# Patient Record
Sex: FEMALE | Race: OTHER | HISPANIC OR LATINO
[De-identification: names, ages, dates, MRNs, and addresses within clinical notes are randomized per-mention and may not be internally consistent; named-entity substitution may affect disease eponyms.]

---

## 2019-06-28 ENCOUNTER — APPOINTMENT (OUTPATIENT)
Dept: FAMILY MEDICINE | Facility: CLINIC | Age: 54
End: 2019-06-28

## 2019-11-02 ENCOUNTER — APPOINTMENT (OUTPATIENT)
Dept: DERMATOLOGY | Facility: CLINIC | Age: 54
End: 2019-11-02
Payer: MEDICAID

## 2019-11-02 PROCEDURE — 99202 OFFICE O/P NEW SF 15 MIN: CPT

## 2019-11-30 ENCOUNTER — APPOINTMENT (OUTPATIENT)
Dept: DERMATOLOGY | Facility: CLINIC | Age: 54
End: 2019-11-30

## 2020-07-16 ENCOUNTER — APPOINTMENT (OUTPATIENT)
Dept: NEUROLOGY | Facility: CLINIC | Age: 55
End: 2020-07-16
Payer: MEDICAID

## 2020-07-16 VITALS — BODY MASS INDEX: 23.04 KG/M2 | TEMPERATURE: 97.7 F | HEIGHT: 63 IN | WEIGHT: 130 LBS

## 2020-07-16 DIAGNOSIS — Z86.39 PERSONAL HISTORY OF OTHER ENDOCRINE, NUTRITIONAL AND METABOLIC DISEASE: ICD-10-CM

## 2020-07-16 DIAGNOSIS — Z86.79 PERSONAL HISTORY OF OTHER DISEASES OF THE CIRCULATORY SYSTEM: ICD-10-CM

## 2020-07-16 PROCEDURE — 99204 OFFICE O/P NEW MOD 45 MIN: CPT

## 2020-07-16 NOTE — ASSESSMENT
[FreeTextEntry1] : Loss of taste and smell since March. Reportedly covid negative on multiple tests. ENT evaluation negative. We will check a brain MRI in the unlikely event that there is an olfactory groove meningioma.

## 2020-07-16 NOTE — HISTORY OF PRESENT ILLNESS
[FreeTextEntry1] : She reports a fairly acute onset of loss of taste and smell on 3/19/20. She has been tested for Covid both nasal swab and antibodies 3 times. She said it was negative. Taste and smell have not returned. She saw an ENT specialist who could find no problem. she offers no other complaints.\par \par Medical history significant for hypertension hyperlipidemia.\par \par She is a smoker. No significant alcohol use. She is a .

## 2020-07-16 NOTE — PHYSICAL EXAM
[General Appearance - Alert] : alert [General Appearance - Well-Appearing] : healthy appearing [General Appearance - In No Acute Distress] : in no acute distress [Affect] : the affect was normal [Oriented To Time, Place, And Person] : oriented to person, place, and time [Impaired Insight] : insight and judgment were intact [Memory Recent] : recent memory was not impaired [Person] : oriented to person [Place] : oriented to place [Time] : oriented to time [Concentration Intact] : normal concentrating ability [Fluency] : fluency intact [Comprehension] : comprehension intact [Cranial Nerves Optic (II)] : visual acuity intact bilaterally,  visual fields full to confrontation, pupils equal round and reactive to light [Cranial Nerves Facial (VII)] : face symmetrical [Cranial Nerves Oculomotor (III)] : extraocular motion intact [Cranial Nerves Trigeminal (V)] : facial sensation intact symmetrically [Cranial Nerves Vestibulocochlear (VIII)] : hearing was intact bilaterally [Cranial Nerves Glossopharyngeal (IX)] : tongue and palate midline [Cranial Nerves Accessory (XI - Cranial And Spinal)] : head turning and shoulder shrug symmetric [Cranial Nerves Hypoglossal (XII)] : there was no tongue deviation with protrusion [Motor Tone] : muscle tone was normal in all four extremities [No Muscle Atrophy] : normal bulk in all four extremities [Motor Strength] : muscle strength was normal in all four extremities [Paresis Pronator Drift Left-Sided] : no pronator drift on the left [Sensation Pain / Temperature Decrease] : pain and temperature was intact [Sensation Tactile Decrease] : light touch was intact [Paresis Pronator Drift Right-Sided] : no pronator drift on the right [Abnormal Walk] : normal gait [Romberg's Sign] : Romberg's sign was negtive [Balance] : balance was intact [Tremor] : no tremor present [Past-pointing] : there was no past-pointing [Coordination - Dysmetria Impaired Heel-to-Shin Bilateral] : not present [Coordination - Dysmetria Impaired Finger-to-Nose Bilateral] : not present [2+] : Ankle jerk left 2+ [Plantar Reflex Left Only] : normal on the left [Plantar Reflex Right Only] : normal on the right [PERRL With Normal Accommodation] : pupils were equal in size, round, reactive to light, with normal accommodation [Extraocular Movements] : extraocular movements were intact [Edema] : there was no peripheral edema

## 2020-07-30 ENCOUNTER — OUTPATIENT (OUTPATIENT)
Dept: OUTPATIENT SERVICES | Facility: HOSPITAL | Age: 55
LOS: 1 days | End: 2020-07-30
Payer: COMMERCIAL

## 2020-07-30 ENCOUNTER — APPOINTMENT (OUTPATIENT)
Dept: MRI IMAGING | Facility: CLINIC | Age: 55
End: 2020-07-30
Payer: MEDICAID

## 2020-07-30 DIAGNOSIS — R43.2 PARAGEUSIA: ICD-10-CM

## 2020-07-30 PROCEDURE — 70553 MRI BRAIN STEM W/O & W/DYE: CPT | Mod: 26

## 2020-07-30 PROCEDURE — A9585: CPT

## 2020-07-30 PROCEDURE — 70553 MRI BRAIN STEM W/O & W/DYE: CPT

## 2021-07-02 ENCOUNTER — NON-APPOINTMENT (OUTPATIENT)
Age: 56
End: 2021-07-02

## 2021-07-13 ENCOUNTER — APPOINTMENT (OUTPATIENT)
Dept: DERMATOLOGY | Facility: CLINIC | Age: 56
End: 2021-07-13
Payer: MEDICAID

## 2021-07-13 PROCEDURE — 99072 ADDL SUPL MATRL&STAF TM PHE: CPT

## 2021-07-13 PROCEDURE — 17110 DESTRUCTION B9 LES UP TO 14: CPT

## 2021-07-13 PROCEDURE — 99212 OFFICE O/P EST SF 10 MIN: CPT | Mod: 25

## 2021-07-27 ENCOUNTER — TRANSCRIPTION ENCOUNTER (OUTPATIENT)
Age: 56
End: 2021-07-27

## 2021-07-29 ENCOUNTER — APPOINTMENT (OUTPATIENT)
Dept: DERMATOLOGY | Facility: CLINIC | Age: 56
End: 2021-07-29
Payer: MEDICAID

## 2021-07-29 PROCEDURE — 10060 I&D ABSCESS SIMPLE/SINGLE: CPT | Mod: 59

## 2021-07-29 PROCEDURE — 99213 OFFICE O/P EST LOW 20 MIN: CPT | Mod: 25

## 2021-07-29 PROCEDURE — 99072 ADDL SUPL MATRL&STAF TM PHE: CPT

## 2021-07-29 PROCEDURE — 17110 DESTRUCTION B9 LES UP TO 14: CPT

## 2021-09-18 ENCOUNTER — OUTPATIENT (OUTPATIENT)
Dept: OUTPATIENT SERVICES | Facility: HOSPITAL | Age: 56
LOS: 1 days | End: 2021-09-18
Payer: COMMERCIAL

## 2021-09-18 ENCOUNTER — APPOINTMENT (OUTPATIENT)
Dept: MRI IMAGING | Facility: CLINIC | Age: 56
End: 2021-09-18
Payer: MEDICAID

## 2021-09-18 DIAGNOSIS — Z00.8 ENCOUNTER FOR OTHER GENERAL EXAMINATION: ICD-10-CM

## 2021-09-18 DIAGNOSIS — R43.0 ANOSMIA: ICD-10-CM

## 2021-09-18 PROCEDURE — A9585: CPT

## 2021-09-18 PROCEDURE — 70553 MRI BRAIN STEM W/O & W/DYE: CPT

## 2021-09-18 PROCEDURE — 70553 MRI BRAIN STEM W/O & W/DYE: CPT | Mod: 26

## 2021-10-17 ENCOUNTER — TRANSCRIPTION ENCOUNTER (OUTPATIENT)
Age: 56
End: 2021-10-17

## 2021-12-16 ENCOUNTER — APPOINTMENT (OUTPATIENT)
Dept: NEUROLOGY | Facility: CLINIC | Age: 56
End: 2021-12-16

## 2022-03-11 ENCOUNTER — APPOINTMENT (OUTPATIENT)
Dept: NEUROLOGY | Facility: CLINIC | Age: 57
End: 2022-03-11
Payer: MEDICAID

## 2022-03-11 VITALS
HEIGHT: 63 IN | WEIGHT: 130 LBS | SYSTOLIC BLOOD PRESSURE: 120 MMHG | DIASTOLIC BLOOD PRESSURE: 70 MMHG | BODY MASS INDEX: 23.04 KG/M2

## 2022-03-11 DIAGNOSIS — R43.0 ANOSMIA: ICD-10-CM

## 2022-03-11 DIAGNOSIS — R43.2 PARAGEUSIA: ICD-10-CM

## 2022-03-11 DIAGNOSIS — I67.81 ACUTE CEREBROVASCULAR INSUFFICIENCY: ICD-10-CM

## 2022-03-11 PROCEDURE — 99214 OFFICE O/P EST MOD 30 MIN: CPT

## 2022-03-11 PROCEDURE — 99072 ADDL SUPL MATRL&STAF TM PHE: CPT

## 2022-03-11 NOTE — ASSESSMENT
[FreeTextEntry1] : Neurologically stable\par Brain MRI shows small vessel ischemic changes\par Discussed with patient.  Encouraged smoking cessation\par Follow-up as needed

## 2022-03-11 NOTE — HISTORY OF PRESENT ILLNESS
[FreeTextEntry1] : I saw her initially 7/16/2020 with the following history.  She reports a fairly acute onset of loss of taste and smell on 3/19/20. She has been tested for Covid both nasal swab and antibodies 3 times. She said it was negative. Taste and smell have not returned. She saw an ENT specialist who could find no problem. she offers no other complaints.\par \par Medical history significant for hypertension hyperlipidemia.\par \par She is a smoker. No significant alcohol use. She is a .\par \par I did a brain MRI on her which showed some small vessel ischemic changes along with a lesion in the right nasopharynx.  She was rechecked by ENT and had another brain MRI on 9/18/2021 which was unchanged\par \par She returns today, 3/11/2022 for follow-up.  She says her taste and smell have come back partially.  She says it turned out she did have Covid back in 2020.  Offers no new complaints.

## 2022-03-11 NOTE — PHYSICAL EXAM
[General Appearance - Alert] : alert [General Appearance - In No Acute Distress] : in no acute distress [General Appearance - Well-Appearing] : healthy appearing [Oriented To Time, Place, And Person] : oriented to person, place, and time [Impaired Insight] : insight and judgment were intact [Affect] : the affect was normal [Memory Recent] : recent memory was not impaired [Person] : oriented to person [Place] : oriented to place [Time] : oriented to time [Concentration Intact] : normal concentrating ability [Fluency] : fluency intact [Comprehension] : comprehension intact [Cranial Nerves Optic (II)] : visual acuity intact bilaterally,  visual fields full to confrontation, pupils equal round and reactive to light [Cranial Nerves Oculomotor (III)] : extraocular motion intact [Cranial Nerves Trigeminal (V)] : facial sensation intact symmetrically [Cranial Nerves Facial (VII)] : face symmetrical [Cranial Nerves Vestibulocochlear (VIII)] : hearing was intact bilaterally [Cranial Nerves Glossopharyngeal (IX)] : tongue and palate midline [Cranial Nerves Accessory (XI - Cranial And Spinal)] : head turning and shoulder shrug symmetric [Cranial Nerves Hypoglossal (XII)] : there was no tongue deviation with protrusion [Motor Tone] : muscle tone was normal in all four extremities [Motor Strength] : muscle strength was normal in all four extremities [No Muscle Atrophy] : normal bulk in all four extremities [Paresis Pronator Drift Right-Sided] : no pronator drift on the right [Paresis Pronator Drift Left-Sided] : no pronator drift on the left [Sensation Tactile Decrease] : light touch was intact [Sensation Pain / Temperature Decrease] : pain and temperature was intact [Romberg's Sign] : Romberg's sign was negtive [Abnormal Walk] : normal gait [Balance] : balance was intact [Past-pointing] : there was no past-pointing [Tremor] : no tremor present [Coordination - Dysmetria Impaired Finger-to-Nose Bilateral] : not present [Coordination - Dysmetria Impaired Heel-to-Shin Bilateral] : not present [2+] : Ankle jerk left 2+ [Plantar Reflex Right Only] : normal on the right [Plantar Reflex Left Only] : normal on the left [PERRL With Normal Accommodation] : pupils were equal in size, round, reactive to light, with normal accommodation [Extraocular Movements] : extraocular movements were intact

## 2022-04-15 ENCOUNTER — APPOINTMENT (OUTPATIENT)
Dept: OTOLARYNGOLOGY | Facility: CLINIC | Age: 57
End: 2022-04-15
Payer: MEDICAID

## 2022-04-15 VITALS
OXYGEN SATURATION: 98 % | TEMPERATURE: 98.1 F | DIASTOLIC BLOOD PRESSURE: 70 MMHG | SYSTOLIC BLOOD PRESSURE: 121 MMHG | WEIGHT: 130.5 LBS | HEIGHT: 63 IN | HEART RATE: 107 BPM | BODY MASS INDEX: 23.12 KG/M2

## 2022-04-15 DIAGNOSIS — J35.01 CHRONIC TONSILLITIS: ICD-10-CM

## 2022-04-15 DIAGNOSIS — R07.0 PAIN IN THROAT: ICD-10-CM

## 2022-04-15 DIAGNOSIS — K13.21 LEUKOPLAKIA OF ORAL MUCOSA, INCLUDING TONGUE: ICD-10-CM

## 2022-04-15 PROCEDURE — 99244 OFF/OP CNSLTJ NEW/EST MOD 40: CPT

## 2022-04-15 NOTE — PHYSICAL EXAM
[Midline] : trachea located in midline position [de-identified] : L ventral leukoplakia. [de-identified] : Tonsil remnants seen in tonsillar fossa bilaterally.  Pt has a large superior L tonsillar fossa crypt [Normal] : no rashes

## 2022-04-15 NOTE — DATA REVIEWED
[de-identified] : IMAGES REVIEWED:\par CT neck soft tissue ([facility] 3/11/22) - Stable R NP cyst present.   No other lesions seen.

## 2022-04-15 NOTE — CONSULT LETTER
[Dear  ___] : Dear  [unfilled], [Consult Letter:] : I had the pleasure of evaluating your patient, [unfilled]. [Please see my note below.] : Please see my note below. [Consult Closing:] : Thank you very much for allowing me to participate in the care of this patient.  If you have any questions, please do not hesitate to contact me. [Sincerely,] : Sincerely, [FreeTextEntry2] : Ronnie Tineo MD [FreeTextEntry3] : Farhat Butcher MD, FACS\par Chief of Otolaryngology [facility]\par  - Dept. of Otolaryngology\par Formerly [facility] [employer]\par \par  [DrMarissa  ___] : Dr. LITTLE

## 2022-04-15 NOTE — ASSESSMENT
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  We will observe x 2 weeks.  If the leukoplakia persists we will consider biopsy and CO2 LASER ablation.  \par \par If tonsil region discomfort persists we may need to consider removal of the tonsil remnant that is present.

## 2022-04-15 NOTE — HISTORY OF PRESENT ILLNESS
[de-identified] : 55 y/o F with a h/o a possible lesion in her throat.  She points to the region of her L tonsillar fossa.  She feels like food gets caught in the area and she has pain when lying on her L side.   She was seen by Dr. Tineo who ordered an MRI.

## 2022-04-15 NOTE — REVIEW OF SYSTEMS
[Nasal Congestion] : nasal congestion [Swelling Neck] : swelling neck [Negative] : Heme/Lymph normal...

## 2022-06-06 ENCOUNTER — APPOINTMENT (OUTPATIENT)
Dept: OTOLARYNGOLOGY | Facility: CLINIC | Age: 57
End: 2022-06-06

## 2022-12-03 ENCOUNTER — OFFICE (OUTPATIENT)
Dept: URBAN - METROPOLITAN AREA CLINIC 112 | Facility: CLINIC | Age: 57
Setting detail: OPHTHALMOLOGY
End: 2022-12-03
Payer: MEDICAID

## 2022-12-03 DIAGNOSIS — H40.053: ICD-10-CM

## 2022-12-03 DIAGNOSIS — Z96.1: ICD-10-CM

## 2022-12-03 DIAGNOSIS — H16.223: ICD-10-CM

## 2022-12-03 PROCEDURE — 92012 INTRM OPH EXAM EST PATIENT: CPT | Performed by: OPHTHALMOLOGY

## 2022-12-03 PROCEDURE — 92250 FUNDUS PHOTOGRAPHY W/I&R: CPT | Performed by: OPHTHALMOLOGY

## 2022-12-03 PROCEDURE — 92083 EXTENDED VISUAL FIELD XM: CPT | Performed by: OPHTHALMOLOGY

## 2022-12-03 ASSESSMENT — CONFRONTATIONAL VISUAL FIELD TEST (CVF)
OS_FINDINGS: FULL
OD_FINDINGS: FULL

## 2022-12-03 ASSESSMENT — REFRACTION_MANIFEST
OS_SPHERE: PLANO
OS_VA1: 20/20
OS_SPHERE: +1.25
OS_CYLINDER: SPH
OD_VA1: 20/40
OD_CYLINDER: SPH
OD_SPHERE: +1.25
OD_ADD: +2.75
OS_CYLINDER: SPHERE
OS_ADD: +2.75
OS_VA1: 20/40
OS_ADD: +2.50
OD_ADD: +2.50
OD_SPHERE: PLANO
OS_CYLINDER: SPH
OS_VA2: 20/20
OD_CYLINDER: SPHERE
OD_SPHERE: PLANO
OD_VA1: 20/20
OD_CYLINDER: SPH
OS_SPHERE: +0.75
OD_VA2: 20/20

## 2022-12-03 ASSESSMENT — REFRACTION_CURRENTRX
OD_CYLINDER: -0.50
OS_CYLINDER: 0.00
OD_VPRISM_DIRECTION: BF
OD_AXIS: 042
OS_OVR_VA: 20/
OD_SPHERE: PLANO
OD_OVR_VA: 20/
OS_SPHERE: +0.50
OS_ADD: +2.50
OD_ADD: +2.25
OS_VPRISM_DIRECTION: BF
OS_AXIS: 000

## 2022-12-03 ASSESSMENT — REFRACTION_AUTOREFRACTION
OD_CYLINDER: -0.75
OS_AXIS: 092
OD_SPHERE: +0.50
OS_CYLINDER: -0.75
OS_SPHERE: +0.75
OD_AXIS: 069

## 2022-12-03 ASSESSMENT — LID POSITION - PTOSIS
OD_PTOSIS: ABSENT
OS_PTOSIS: ABSENT

## 2022-12-03 ASSESSMENT — KERATOMETRY
OD_AXISANGLE_DEGREES: 129
OS_K2POWER_DIOPTERS: 45.25
OS_K1POWER_DIOPTERS: 45.25
METHOD_AUTO_MANUAL: AUTO
OD_K2POWER_DIOPTERS: 45.25
OD_K1POWER_DIOPTERS: 45.00
OS_AXISANGLE_DEGREES: 090

## 2022-12-03 ASSESSMENT — VISUAL ACUITY
OD_BCVA: 20/25
OS_BCVA: 20/25+1

## 2022-12-03 ASSESSMENT — PACHYMETRY
OS_CT_CORRECTION: -6
OD_CT_UM: 614
OS_CT_UM: 626
OD_CT_CORRECTION: -5

## 2022-12-03 ASSESSMENT — AXIALLENGTH_DERIVED
OS_AL: 22.8285
OD_AL: 22.9635

## 2022-12-03 ASSESSMENT — SUPERFICIAL PUNCTATE KERATITIS (SPK)
OS_SPK: 1+
OD_SPK: 1+

## 2022-12-03 ASSESSMENT — SPHEQUIV_DERIVED
OS_SPHEQUIV: 0.375
OD_SPHEQUIV: 0.125

## 2022-12-03 ASSESSMENT — TONOMETRY
OD_IOP_MMHG: 20
OS_IOP_MMHG: 20

## 2022-12-03 ASSESSMENT — CORNEAL PTERYGIUM: OD_PTERYGIUM: TEMPORAL

## 2022-12-28 ENCOUNTER — APPOINTMENT (OUTPATIENT)
Dept: FAMILY MEDICINE | Facility: CLINIC | Age: 57
End: 2022-12-28

## 2023-05-19 ENCOUNTER — NON-APPOINTMENT (OUTPATIENT)
Age: 58
End: 2023-05-19

## 2023-05-19 ENCOUNTER — APPOINTMENT (OUTPATIENT)
Dept: INTERNAL MEDICINE | Facility: CLINIC | Age: 58
End: 2023-05-19
Payer: MEDICAID

## 2023-05-19 ENCOUNTER — LABORATORY RESULT (OUTPATIENT)
Age: 58
End: 2023-05-19

## 2023-05-19 VITALS
WEIGHT: 120 LBS | HEIGHT: 63 IN | DIASTOLIC BLOOD PRESSURE: 82 MMHG | SYSTOLIC BLOOD PRESSURE: 137 MMHG | BODY MASS INDEX: 21.26 KG/M2 | HEART RATE: 72 BPM

## 2023-05-19 DIAGNOSIS — Z82.3 FAMILY HISTORY OF STROKE: ICD-10-CM

## 2023-05-19 DIAGNOSIS — Z00.00 ENCOUNTER FOR GENERAL ADULT MEDICAL EXAMINATION W/OUT ABNORMAL FINDINGS: ICD-10-CM

## 2023-05-19 DIAGNOSIS — Z63.5 DISRUPTION OF FAMILY BY SEPARATION AND DIVORCE: ICD-10-CM

## 2023-05-19 DIAGNOSIS — R11.0 NAUSEA: ICD-10-CM

## 2023-05-19 DIAGNOSIS — F17.200 NICOTINE DEPENDENCE, UNSPECIFIED, UNCOMPLICATED: ICD-10-CM

## 2023-05-19 DIAGNOSIS — G44.209 TENSION-TYPE HEADACHE, UNSPECIFIED, NOT INTRACTABLE: ICD-10-CM

## 2023-05-19 DIAGNOSIS — J30.2 OTHER SEASONAL ALLERGIC RHINITIS: ICD-10-CM

## 2023-05-19 DIAGNOSIS — Z80.41 FAMILY HISTORY OF MALIGNANT NEOPLASM OF OVARY: ICD-10-CM

## 2023-05-19 DIAGNOSIS — F17.210 NICOTINE DEPENDENCE, CIGARETTES, UNCOMPLICATED: ICD-10-CM

## 2023-05-19 DIAGNOSIS — Z83.3 FAMILY HISTORY OF DIABETES MELLITUS: ICD-10-CM

## 2023-05-19 PROCEDURE — 99203 OFFICE O/P NEW LOW 30 MIN: CPT | Mod: 25

## 2023-05-19 PROCEDURE — 99386 PREV VISIT NEW AGE 40-64: CPT | Mod: 25

## 2023-05-19 PROCEDURE — 93000 ELECTROCARDIOGRAM COMPLETE: CPT

## 2023-05-19 PROCEDURE — 36415 COLL VENOUS BLD VENIPUNCTURE: CPT

## 2023-05-19 SDOH — SOCIAL STABILITY - SOCIAL INSECURITY: DISRUPTION OF FAMILY BY SEPARATION AND DIVORCE: Z63.5

## 2023-05-19 NOTE — ASSESSMENT
[FreeTextEntry1] : seasonal allergies\par cough\par sent meds\par bp stable\par hld to be checked\par get mammogram\par followup 6months\par gave new gyn referral

## 2023-05-19 NOTE — HISTORY OF PRESENT ILLNESS
[FreeTextEntry1] : For CPE [de-identified] : For CPE\par patient has history of htn, hld, had covid lost olfactory now better\par she has been coughing due to allergies and gets nauseated only new issues, allergies as well\par had egd and colonscopy age 51\par no chest pain sob or palpitations, drinks\par 8 cups of coffee dialy\par smokes  a pack last 3 weeks

## 2023-05-19 NOTE — HEALTH RISK ASSESSMENT
[Excellent] : ~his/her~  mood as  excellent [Yes] : Yes [Monthly or less (1 pt)] : Monthly or less (1 point) [0] : 2) Feeling down, depressed, or hopeless: Not at all (0) [PHQ-2 Negative - No further assessment needed] : PHQ-2 Negative - No further assessment needed [GLG1Omang] : 0 [HIV test declined] : HIV test declined [Hepatitis C test declined] : Hepatitis C test declined [Change in mental status noted] : No change in mental status noted [Language] : denies difficulty with language [Behavior] : denies difficulty with behavior [Learning/Retaining New Information] : denies difficulty learning/retaining new information [Handling Complex Tasks] : denies difficulty handling complex tasks [Reasoning] : denies difficulty with reasoning [Spatial Ability and Orientation] : denies difficulty with spatial ability and orientation [None] : None [Employed] : employed [Single] : single [] :  [Sexually Active] : not sexually active [High Risk Behavior] : no high risk behavior [Fully functional (bathing, dressing, toileting, transferring, walking, feeding)] : Fully functional (bathing, dressing, toileting, transferring, walking, feeding) [Fully functional (using the telephone, shopping, preparing meals, housekeeping, doing laundry, using] : Fully functional and needs no help or supervision to perform IADLs (using the telephone, shopping, preparing meals, housekeeping, doing laundry, using transportation, managing medications and managing finances) [Reports changes in hearing] : Reports no changes in hearing [Reports changes in vision] : Reports no changes in vision [Reports normal functional visual acuity (ie: able to read med bottle)] : Reports poor functional visual acuity.  [Reports changes in dental health] : Reports no changes in dental health [Smoke Detector] : smoke detector [Carbon Monoxide Detector] : carbon monoxide detector [Guns at Home] : no guns at home [Safety elements used in home] : safety elements used in home [Seat Belt] :  uses seat belt [Sunscreen] : does not use sunscreen [Travel to Developing Areas] : does not  travel to developing areas [TB Exposure] : is not being exposed to tuberculosis [Caregiver Concerns] : does not have caregiver concerns [Current] : Current [5-9] : 5-9

## 2023-05-19 NOTE — PHYSICAL EXAM
[No Acute Distress] : no acute distress [Well Nourished] : well nourished [Well Developed] : well developed [Well-Appearing] : well-appearing [Normal Sclera/Conjunctiva] : normal sclera/conjunctiva [PERRL] : pupils equal round and reactive to light [EOMI] : extraocular movements intact [Normal Outer Ear/Nose] : the outer ears and nose were normal in appearance [Normal Oropharynx] : the oropharynx was normal [No JVD] : no jugular venous distention [No Lymphadenopathy] : no lymphadenopathy [Supple] : supple [Thyroid Normal, No Nodules] : the thyroid was normal and there were no nodules present [No Respiratory Distress] : no respiratory distress  [No Accessory Muscle Use] : no accessory muscle use Hydroquinone Counseling:  Patient advised that medication may result in skin irritation, lightening (hypopigmentation), dryness, and burning.  In the event of skin irritation, the patient was advised to reduce the amount of the drug applied or use it less frequently.  Rarely, spots that are treated with hydroquinone can become darker (pseudoochronosis).  Should this occur, patient instructed to stop medication and call the office. The patient verbalized understanding of the proper use and possible adverse effects of hydroquinone.  All of the patient's questions and concerns were addressed. [Clear to Auscultation] : lungs were clear to auscultation bilaterally [Normal Rate] : normal rate  [Regular Rhythm] : with a regular rhythm [Normal S1, S2] : normal S1 and S2 [No Murmur] : no murmur heard [No Carotid Bruits] : no carotid bruits [No Abdominal Bruit] : a ~M bruit was not heard ~T in the abdomen [No Varicosities] : no varicosities [Pedal Pulses Present] : the pedal pulses are present [No Edema] : there was no peripheral edema [No Palpable Aorta] : no palpable aorta [No Extremity Clubbing/Cyanosis] : no extremity clubbing/cyanosis [Soft] : abdomen soft [Non Tender] : non-tender [Non-distended] : non-distended [No Masses] : no abdominal mass palpated [No HSM] : no HSM [Normal Bowel Sounds] : normal bowel sounds [Normal Posterior Cervical Nodes] : no posterior cervical lymphadenopathy [Normal Anterior Cervical Nodes] : no anterior cervical lymphadenopathy [No CVA Tenderness] : no CVA  tenderness [No Spinal Tenderness] : no spinal tenderness [No Joint Swelling] : no joint swelling [Grossly Normal Strength/Tone] : grossly normal strength/tone [No Rash] : no rash [Coordination Grossly Intact] : coordination grossly intact [No Focal Deficits] : no focal deficits [Normal Gait] : normal gait [Deep Tendon Reflexes (DTR)] : deep tendon reflexes were 2+ and symmetric [Normal Affect] : the affect was normal [Normal Insight/Judgement] : insight and judgment were intact

## 2023-05-20 LAB
ALBUMIN SERPL ELPH-MCNC: 4.7 G/DL
ALP BLD-CCNC: 96 U/L
ALT SERPL-CCNC: 15 U/L
ANION GAP SERPL CALC-SCNC: 14 MMOL/L
APPEARANCE: CLEAR
AST SERPL-CCNC: 22 U/L
BILIRUB SERPL-MCNC: 0.2 MG/DL
BILIRUBIN URINE: NEGATIVE
BLOOD URINE: NEGATIVE
BUN SERPL-MCNC: 6 MG/DL
CALCIUM SERPL-MCNC: 10.5 MG/DL
CHLORIDE SERPL-SCNC: 97 MMOL/L
CHOLEST SERPL-MCNC: 181 MG/DL
CO2 SERPL-SCNC: 23 MMOL/L
COLOR: YELLOW
CREAT SERPL-MCNC: 0.69 MG/DL
CREAT SPEC-SCNC: 71 MG/DL
EGFR: 102 ML/MIN/1.73M2
ESTIMATED AVERAGE GLUCOSE: 120 MG/DL
GLUCOSE QUALITATIVE U: NEGATIVE MG/DL
GLUCOSE SERPL-MCNC: 102 MG/DL
HBA1C MFR BLD HPLC: 5.8 %
HDLC SERPL-MCNC: 37 MG/DL
KETONES URINE: NEGATIVE MG/DL
LDLC SERPL CALC-MCNC: 101 MG/DL
LEUKOCYTE ESTERASE URINE: ABNORMAL
MICROALBUMIN 24H UR DL<=1MG/L-MCNC: 1.2 MG/DL
MICROALBUMIN/CREAT 24H UR-RTO: 17 MG/G
NITRITE URINE: NEGATIVE
NONHDLC SERPL-MCNC: 144 MG/DL
PH URINE: 6.5
POTASSIUM SERPL-SCNC: 5.2 MMOL/L
PROT SERPL-MCNC: 7.4 G/DL
PROTEIN URINE: NEGATIVE MG/DL
SODIUM SERPL-SCNC: 134 MMOL/L
SPECIFIC GRAVITY URINE: 1.01
T4 FREE SERPL-MCNC: 1 NG/DL
TRIGL SERPL-MCNC: 213 MG/DL
TSH SERPL-ACNC: 0.79 UIU/ML
UROBILINOGEN URINE: 0.2 MG/DL

## 2023-06-03 ENCOUNTER — OFFICE (OUTPATIENT)
Dept: URBAN - METROPOLITAN AREA CLINIC 112 | Facility: CLINIC | Age: 58
Setting detail: OPHTHALMOLOGY
End: 2023-06-03
Payer: MEDICAID

## 2023-06-03 DIAGNOSIS — H16.223: ICD-10-CM

## 2023-06-03 DIAGNOSIS — H40.053: ICD-10-CM

## 2023-06-03 DIAGNOSIS — Z96.1: ICD-10-CM

## 2023-06-03 PROCEDURE — 92014 COMPRE OPH EXAM EST PT 1/>: CPT | Performed by: OPHTHALMOLOGY

## 2023-06-03 PROCEDURE — 92133 CPTRZD OPH DX IMG PST SGM ON: CPT | Performed by: OPHTHALMOLOGY

## 2023-06-03 PROCEDURE — 83861 MICROFLUID ANALY TEARS: CPT | Performed by: OPHTHALMOLOGY

## 2023-06-03 PROCEDURE — 92020 GONIOSCOPY: CPT | Performed by: OPHTHALMOLOGY

## 2023-06-03 ASSESSMENT — REFRACTION_MANIFEST
OS_ADD: +2.75
OS_CYLINDER: SPH
OD_ADD: +2.75
OS_SPHERE: +1.25
OS_SPHERE: PLANO
OD_SPHERE: PLANO
OD_CYLINDER: SPH
OS_ADD: +2.50
OS_CYLINDER: SPHERE
OS_CYLINDER: SPH
OD_VA1: 20/40
OS_VA1: 20/20
OS_SPHERE: +0.75
OD_VA1: 20/20
OD_SPHERE: PLANO
OS_VA1: 20/40
OD_CYLINDER: SPH
OD_ADD: +2.50
OD_SPHERE: +1.25
OS_VA2: 20/20
OD_VA2: 20/20
OD_CYLINDER: SPHERE

## 2023-06-03 ASSESSMENT — KERATOMETRY
OS_K1POWER_DIOPTERS: 45.25
OS_K2POWER_DIOPTERS: 45.25
OD_K2POWER_DIOPTERS: 45.25
OD_K1POWER_DIOPTERS: 45.00
METHOD_AUTO_MANUAL: AUTO
OD_AXISANGLE_DEGREES: 122
OS_AXISANGLE_DEGREES: 096

## 2023-06-03 ASSESSMENT — AXIALLENGTH_DERIVED
OS_AL: 22.8742
OD_AL: 23.0098

## 2023-06-03 ASSESSMENT — REFRACTION_CURRENTRX
OS_CYLINDER: 0.00
OD_ADD: +2.25
OS_VPRISM_DIRECTION: BF
OS_AXIS: 000
OD_VPRISM_DIRECTION: BF
OS_SPHERE: +0.50
OS_OVR_VA: 20/
OS_ADD: +2.50
OD_AXIS: 042
OD_OVR_VA: 20/
OD_CYLINDER: -0.50
OD_SPHERE: PLANO

## 2023-06-03 ASSESSMENT — LID POSITION - PTOSIS
OS_PTOSIS: ABSENT
OD_PTOSIS: ABSENT

## 2023-06-03 ASSESSMENT — REFRACTION_AUTOREFRACTION
OS_CYLINDER: -0.50
OD_CYLINDER: -0.50
OS_AXIS: 096
OD_SPHERE: +0.25
OS_SPHERE: +0.50
OD_AXIS: 054

## 2023-06-03 ASSESSMENT — SPHEQUIV_DERIVED
OS_SPHEQUIV: 0.25
OD_SPHEQUIV: 0

## 2023-06-03 ASSESSMENT — VISUAL ACUITY
OD_BCVA: 20/25+1
OS_BCVA: 20/25

## 2023-06-03 ASSESSMENT — SUPERFICIAL PUNCTATE KERATITIS (SPK)
OD_SPK: 1+
OS_SPK: 1+

## 2023-06-03 ASSESSMENT — PACHYMETRY
OS_CT_CORRECTION: -6
OD_CT_UM: 614
OD_CT_CORRECTION: -5
OS_CT_UM: 626

## 2023-06-03 ASSESSMENT — CONFRONTATIONAL VISUAL FIELD TEST (CVF)
OD_FINDINGS: FULL
OS_FINDINGS: FULL

## 2023-06-03 ASSESSMENT — TONOMETRY
OD_IOP_MMHG: 18
OS_IOP_MMHG: 18

## 2023-06-03 ASSESSMENT — CORNEAL PTERYGIUM: OD_PTERYGIUM: TEMPORAL

## 2023-06-10 ENCOUNTER — OFFICE (OUTPATIENT)
Dept: URBAN - METROPOLITAN AREA CLINIC 112 | Facility: CLINIC | Age: 58
Setting detail: OPHTHALMOLOGY
End: 2023-06-10
Payer: MEDICAID

## 2023-06-10 DIAGNOSIS — H02.423: ICD-10-CM

## 2023-06-10 DIAGNOSIS — H02.524: ICD-10-CM

## 2023-06-10 DIAGNOSIS — H02.521: ICD-10-CM

## 2023-06-10 PROBLEM — H02.422 PTOSIS MYOGENIC; RIGHT EYE, LEFT EYE, BOTH EYES: Status: ACTIVE | Noted: 2023-06-10

## 2023-06-10 PROBLEM — H02.421 PTOSIS MYOGENIC; RIGHT EYE, LEFT EYE, BOTH EYES: Status: ACTIVE | Noted: 2023-06-10

## 2023-06-10 PROCEDURE — 92082 INTERMEDIATE VISUAL FIELD XM: CPT | Performed by: OPHTHALMOLOGY

## 2023-06-10 PROCEDURE — 92012 INTRM OPH EXAM EST PATIENT: CPT | Performed by: OPHTHALMOLOGY

## 2023-06-10 PROCEDURE — 92285 EXTERNAL OCULAR PHOTOGRAPHY: CPT | Performed by: OPHTHALMOLOGY

## 2023-06-10 ASSESSMENT — REFRACTION_CURRENTRX
OD_AXIS: 042
OD_ADD: +2.25
OS_OVR_VA: 20/
OS_AXIS: 000
OD_CYLINDER: -0.50
OD_VPRISM_DIRECTION: BF
OS_SPHERE: +0.50
OS_VPRISM_DIRECTION: BF
OS_ADD: +2.50
OS_CYLINDER: 0.00
OD_OVR_VA: 20/
OD_SPHERE: PLANO

## 2023-06-10 ASSESSMENT — REFRACTION_MANIFEST
OS_ADD: +2.75
OD_CYLINDER: SPHERE
OD_VA1: 20/20
OS_VA1: 20/40
OD_VA1: 20/40
OS_SPHERE: PLANO
OS_ADD: +2.50
OS_CYLINDER: SPHERE
OD_SPHERE: PLANO
OD_CYLINDER: SPH
OD_ADD: +2.75
OS_SPHERE: +0.75
OS_CYLINDER: SPH
OS_SPHERE: +1.25
OS_CYLINDER: SPH
OD_SPHERE: PLANO
OD_ADD: +2.50
OD_CYLINDER: SPH
OS_VA1: 20/20
OD_VA2: 20/20
OS_VA2: 20/20
OD_SPHERE: +1.25

## 2023-06-10 ASSESSMENT — PACHYMETRY
OS_CT_UM: 626
OD_CT_UM: 614
OS_CT_CORRECTION: -6
OD_CT_CORRECTION: -5

## 2023-06-10 ASSESSMENT — REFRACTION_AUTOREFRACTION
OS_CYLINDER: -0.25
OS_AXIS: 097
OS_SPHERE: +0.50
OD_SPHERE: +0.25
OD_AXIS: 057
OD_CYLINDER: -0.50

## 2023-06-10 ASSESSMENT — CORNEAL PTERYGIUM: OD_PTERYGIUM: TEMPORAL

## 2023-06-10 ASSESSMENT — KERATOMETRY
OS_AXISANGLE_DEGREES: 090
OD_K1POWER_DIOPTERS: 44.75
OD_AXISANGLE_DEGREES: 111
METHOD_AUTO_MANUAL: AUTO
OS_K1POWER_DIOPTERS: 45.25
OD_K2POWER_DIOPTERS: 45.25
OS_K2POWER_DIOPTERS: 45.25

## 2023-06-10 ASSESSMENT — VISUAL ACUITY
OD_BCVA: 20/25+
OS_BCVA: 20/25

## 2023-06-10 ASSESSMENT — TONOMETRY
OS_IOP_MMHG: 21
OD_IOP_MMHG: 20

## 2023-06-10 ASSESSMENT — AXIALLENGTH_DERIVED
OD_AL: 23.0535
OS_AL: 22.8285

## 2023-06-10 ASSESSMENT — SPHEQUIV_DERIVED
OD_SPHEQUIV: 0
OS_SPHEQUIV: 0.375

## 2023-06-10 ASSESSMENT — SUPERFICIAL PUNCTATE KERATITIS (SPK)
OS_SPK: 1+
OD_SPK: 1+

## 2023-07-28 ENCOUNTER — RX RENEWAL (OUTPATIENT)
Age: 58
End: 2023-07-28

## 2023-08-14 ENCOUNTER — APPOINTMENT (OUTPATIENT)
Dept: INTERNAL MEDICINE | Facility: CLINIC | Age: 58
End: 2023-08-14
Payer: MEDICAID

## 2023-08-14 VITALS
SYSTOLIC BLOOD PRESSURE: 130 MMHG | DIASTOLIC BLOOD PRESSURE: 82 MMHG | HEIGHT: 63 IN | BODY MASS INDEX: 21.26 KG/M2 | WEIGHT: 120 LBS

## 2023-08-14 VITALS — HEIGHT: 63 IN | BODY MASS INDEX: 21.26 KG/M2 | WEIGHT: 120 LBS

## 2023-08-14 DIAGNOSIS — Z01.818 ENCOUNTER FOR OTHER PREPROCEDURAL EXAMINATION: ICD-10-CM

## 2023-08-14 DIAGNOSIS — H02.403 UNSPECIFIED PTOSIS OF BILATERAL EYELIDS: ICD-10-CM

## 2023-08-14 DIAGNOSIS — L23.7 ALLERGIC CONTACT DERMATITIS DUE TO PLANTS, EXCEPT FOOD: ICD-10-CM

## 2023-08-14 PROCEDURE — 99214 OFFICE O/P EST MOD 30 MIN: CPT

## 2023-08-14 NOTE — ASSESSMENT
[Patient Optimized for Surgery] : Patient optimized for surgery [No Further Testing Recommended] : no further testing recommended [FreeTextEntry4] : Patient is low risk for planned surgery [FreeTextEntry7] : take bp

## 2023-08-14 NOTE — HISTORY OF PRESENT ILLNESS
[No Pertinent Cardiac History] : no history of aortic stenosis, atrial fibrillation, coronary artery disease, recent myocardial infarction, or implantable device/pacemaker [Aortic Stenosis] : no aortic stenosis [Atrial Fibrillation] : no atrial fibrillation [Coronary Artery Disease] : no coronary artery disease [Recent Myocardial Infarction] : no recent myocardial infarction [Implantable Device/Pacemaker] : no implantable device/pacemaker [Asthma] : no asthma [COPD] : no COPD [Sleep Apnea] : no sleep apnea [Smoker] : smoker [No Adverse Anesthesia Reaction] : no adverse anesthesia reaction in self or family member [Family Member] : no family member with adverse anesthesia reaction/sudden death [Chronic Anticoagulation] : no chronic anticoagulation [Chronic Kidney Disease] : no chronic kidney disease [Diabetes] : no diabetes [FreeTextEntry1] : bilateral ptosis [FreeTextEntry2] : 8/21/23 [FreeTextEntry3] : Dr. jewels Rucker [FreeTextEntry4] : Patient with history of htn and hld who will undego bilateral ptosis repair

## 2023-08-21 ENCOUNTER — ASC (OUTPATIENT)
Dept: URBAN - METROPOLITAN AREA SURGERY 8 | Facility: SURGERY | Age: 58
Setting detail: OPHTHALMOLOGY
End: 2023-08-21
Payer: MEDICAID

## 2023-08-21 DIAGNOSIS — H02.421: ICD-10-CM

## 2023-08-21 DIAGNOSIS — H02.422: ICD-10-CM

## 2023-08-21 DIAGNOSIS — H02.521: ICD-10-CM

## 2023-08-21 DIAGNOSIS — H02.524: ICD-10-CM

## 2023-08-21 PROCEDURE — 67900 REPAIR BROW DEFECT: CPT | Performed by: OPHTHALMOLOGY

## 2023-08-21 PROCEDURE — 67904 REPAIR EYELID DEFECT: CPT | Performed by: OPHTHALMOLOGY

## 2023-08-22 ENCOUNTER — OFFICE (OUTPATIENT)
Dept: URBAN - METROPOLITAN AREA CLINIC 112 | Facility: CLINIC | Age: 58
Setting detail: OPHTHALMOLOGY
End: 2023-08-22
Payer: MEDICAID

## 2023-08-22 ENCOUNTER — RX ONLY (RX ONLY)
Age: 58
End: 2023-08-22

## 2023-08-22 DIAGNOSIS — H02.421: ICD-10-CM

## 2023-08-22 DIAGNOSIS — H02.423: ICD-10-CM

## 2023-08-22 DIAGNOSIS — H02.521: ICD-10-CM

## 2023-08-22 DIAGNOSIS — H02.422: ICD-10-CM

## 2023-08-22 DIAGNOSIS — H02.524: ICD-10-CM

## 2023-08-22 PROCEDURE — 99024 POSTOP FOLLOW-UP VISIT: CPT | Performed by: OPHTHALMOLOGY

## 2023-08-22 ASSESSMENT — REFRACTION_MANIFEST
OD_CYLINDER: SPH
OS_SPHERE: +1.25
OD_CYLINDER: SPHERE
OD_VA1: 20/40
OD_SPHERE: +1.25
OD_VA2: 20/20
OS_CYLINDER: SPH
OS_VA1: 20/40
OS_SPHERE: PLANO
OS_CYLINDER: SPH
OD_ADD: +2.50
OS_CYLINDER: SPHERE
OS_VA2: 20/20
OD_CYLINDER: SPH
OS_VA1: 20/20
OS_ADD: +2.50
OS_ADD: +2.75
OD_VA1: 20/20
OD_ADD: +2.75
OD_SPHERE: PLANO
OS_SPHERE: +0.75
OD_SPHERE: PLANO

## 2023-08-22 ASSESSMENT — REFRACTION_AUTOREFRACTION
OD_SPHERE: +0.25
OD_AXIS: 057
OS_SPHERE: +0.50
OS_AXIS: 097
OD_CYLINDER: -0.50
OS_CYLINDER: -0.25

## 2023-08-22 ASSESSMENT — REFRACTION_CURRENTRX
OD_OVR_VA: 20/
OD_SPHERE: PLANO
OD_AXIS: 042
OS_OVR_VA: 20/
OD_ADD: +2.25
OS_VPRISM_DIRECTION: BF
OD_VPRISM_DIRECTION: BF
OD_CYLINDER: -0.50
OS_CYLINDER: 0.00
OS_AXIS: 000
OS_SPHERE: +0.50
OS_ADD: +2.50

## 2023-08-22 ASSESSMENT — SPHEQUIV_DERIVED
OD_SPHEQUIV: 0
OS_SPHEQUIV: 0.375

## 2023-08-22 ASSESSMENT — KERATOMETRY
OS_K2POWER_DIOPTERS: 45.25
OS_AXISANGLE_DEGREES: 090
OD_K1POWER_DIOPTERS: 44.75
OD_AXISANGLE_DEGREES: 111
OS_K1POWER_DIOPTERS: 45.25
METHOD_AUTO_MANUAL: AUTO
OD_K2POWER_DIOPTERS: 45.25

## 2023-08-22 ASSESSMENT — VISUAL ACUITY
OS_BCVA: 20/30
OD_BCVA: 20/30

## 2023-08-22 ASSESSMENT — AXIALLENGTH_DERIVED
OS_AL: 22.8285
OD_AL: 23.0535

## 2023-08-31 ENCOUNTER — APPOINTMENT (OUTPATIENT)
Dept: CARDIOLOGY | Facility: CLINIC | Age: 58
End: 2023-08-31
Payer: MEDICAID

## 2023-08-31 ENCOUNTER — NON-APPOINTMENT (OUTPATIENT)
Age: 58
End: 2023-08-31

## 2023-08-31 VITALS
SYSTOLIC BLOOD PRESSURE: 146 MMHG | BODY MASS INDEX: 21.62 KG/M2 | HEIGHT: 63 IN | OXYGEN SATURATION: 98 % | HEART RATE: 83 BPM | TEMPERATURE: 98.4 F | DIASTOLIC BLOOD PRESSURE: 80 MMHG | WEIGHT: 122 LBS

## 2023-08-31 VITALS — DIASTOLIC BLOOD PRESSURE: 78 MMHG | SYSTOLIC BLOOD PRESSURE: 144 MMHG

## 2023-08-31 PROCEDURE — 99203 OFFICE O/P NEW LOW 30 MIN: CPT | Mod: 25

## 2023-08-31 PROCEDURE — 93000 ELECTROCARDIOGRAM COMPLETE: CPT

## 2023-08-31 RX ORDER — BENZONATATE 200 MG/1
200 CAPSULE ORAL 3 TIMES DAILY
Qty: 1 | Refills: 0 | Status: DISCONTINUED | COMMUNITY
Start: 2023-05-19 | End: 2023-08-31

## 2023-08-31 NOTE — DISCUSSION/SUMMARY
[FreeTextEntry1] : Patient with strong family history of CAD.  Plan for exercise stress test for routine evaluation.  TTE to assess for LV thickness due to hypertension.  Advised to start using BP machine.  Follow up in 6 weeks.  [EKG obtained to assist in diagnosis and management of assessed problem(s)] : EKG obtained to assist in diagnosis and management of assessed problem(s)

## 2023-08-31 NOTE — HISTORY OF PRESENT ILLNESS
[FreeTextEntry1] : Patient with family history of CAD presenting for evaluation. Strong family history of heart disease. 3 cigs/day.  Hypertension- On meds.  HLD- On statin therapy. States her diet is appropriate.  States that she has had cardiac workup in the past. 5-6 years prior and states that is normal.  Denies chest pain, shortness of breath.

## 2023-09-03 ENCOUNTER — OFFICE (OUTPATIENT)
Dept: URBAN - METROPOLITAN AREA CLINIC 94 | Facility: CLINIC | Age: 58
Setting detail: OPHTHALMOLOGY
End: 2023-09-03
Payer: MEDICAID

## 2023-09-03 DIAGNOSIS — H02.524: ICD-10-CM

## 2023-09-03 DIAGNOSIS — H02.521: ICD-10-CM

## 2023-09-03 DIAGNOSIS — H02.421: ICD-10-CM

## 2023-09-03 DIAGNOSIS — H02.422: ICD-10-CM

## 2023-09-03 DIAGNOSIS — H02.423: ICD-10-CM

## 2023-09-03 PROCEDURE — 99024 POSTOP FOLLOW-UP VISIT: CPT | Performed by: REGISTERED NURSE

## 2023-09-03 ASSESSMENT — SPHEQUIV_DERIVED
OS_SPHEQUIV: 0.125
OD_SPHEQUIV: -1.5

## 2023-09-03 ASSESSMENT — PACHYMETRY
OD_CT_CORRECTION: -5
OS_CT_CORRECTION: -6
OD_CT_UM: 614
OS_CT_UM: 626

## 2023-09-03 ASSESSMENT — REFRACTION_MANIFEST
OD_CYLINDER: SPHERE
OD_VA2: 20/20
OD_CYLINDER: SPH
OS_ADD: +2.50
OD_VA1: 20/40
OD_SPHERE: +1.25
OS_SPHERE: PLANO
OD_SPHERE: PLANO
OD_ADD: +2.75
OD_ADD: +2.50
OD_CYLINDER: SPH
OS_VA1: 20/40
OS_SPHERE: +0.75
OD_VA1: 20/20
OS_VA2: 20/20
OS_CYLINDER: SPH
OS_VA1: 20/20
OS_SPHERE: +1.25
OS_CYLINDER: SPH
OS_CYLINDER: SPHERE
OS_ADD: +2.75
OD_SPHERE: PLANO

## 2023-09-03 ASSESSMENT — KERATOMETRY
OD_K2POWER_DIOPTERS: 49.00
OS_K1POWER_DIOPTERS: 45.25
OS_K2POWER_DIOPTERS: 45.75
OS_AXISANGLE_DEGREES: 095
METHOD_AUTO_MANUAL: AUTO
OD_AXISANGLE_DEGREES: 094
OD_K1POWER_DIOPTERS: 45.00

## 2023-09-03 ASSESSMENT — CONFRONTATIONAL VISUAL FIELD TEST (CVF)
OS_FINDINGS: FULL
OD_FINDINGS: FULL

## 2023-09-03 ASSESSMENT — REFRACTION_CURRENTRX
OS_OVR_VA: 20/
OD_AXIS: 042
OS_VPRISM_DIRECTION: BF
OD_ADD: +2.25
OD_CYLINDER: -0.50
OS_AXIS: 000
OS_ADD: +2.50
OD_OVR_VA: 20/
OD_SPHERE: PLANO
OS_SPHERE: +0.50
OS_CYLINDER: 0.00
OD_VPRISM_DIRECTION: BF

## 2023-09-03 ASSESSMENT — VISUAL ACUITY
OD_BCVA: 20/20-1
OS_BCVA: 20/20+1

## 2023-09-03 ASSESSMENT — REFRACTION_AUTOREFRACTION
OS_AXIS: 064
OS_SPHERE: +0.25
OD_CYLINDER: -2.00
OD_AXIS: 009
OS_CYLINDER: -0.25
OD_SPHERE: -0.50

## 2023-09-03 ASSESSMENT — AXIALLENGTH_DERIVED
OS_AL: 22.8338
OD_AL: 22.911

## 2023-09-03 ASSESSMENT — SUPERFICIAL PUNCTATE KERATITIS (SPK)
OS_SPK: 1+
OD_SPK: 1+

## 2023-09-03 ASSESSMENT — CORNEAL PTERYGIUM: OD_PTERYGIUM: TEMPORAL

## 2023-09-09 ENCOUNTER — OFFICE (OUTPATIENT)
Dept: URBAN - METROPOLITAN AREA CLINIC 112 | Facility: CLINIC | Age: 58
Setting detail: OPHTHALMOLOGY
End: 2023-09-09
Payer: MEDICAID

## 2023-09-09 DIAGNOSIS — H02.422: ICD-10-CM

## 2023-09-09 DIAGNOSIS — H02.524: ICD-10-CM

## 2023-09-09 DIAGNOSIS — H02.521: ICD-10-CM

## 2023-09-09 DIAGNOSIS — H02.421: ICD-10-CM

## 2023-09-09 DIAGNOSIS — H02.423: ICD-10-CM

## 2023-09-09 PROCEDURE — 99024 POSTOP FOLLOW-UP VISIT: CPT | Performed by: OPHTHALMOLOGY

## 2023-09-09 ASSESSMENT — REFRACTION_MANIFEST
OD_SPHERE: +1.25
OS_VA1: 20/40
OS_VA1: 20/20
OD_CYLINDER: SPH
OS_SPHERE: +1.25
OS_ADD: +2.75
OS_CYLINDER: SPH
OS_CYLINDER: SPH
OD_CYLINDER: SPH
OD_VA1: 20/20
OS_ADD: +2.50
OS_SPHERE: PLANO
OS_VA2: 20/20
OD_SPHERE: PLANO
OD_VA2: 20/20
OD_ADD: +2.50
OS_SPHERE: +0.75
OD_CYLINDER: SPHERE
OD_VA1: 20/40
OD_ADD: +2.75
OS_CYLINDER: SPHERE
OD_SPHERE: PLANO

## 2023-09-09 ASSESSMENT — REFRACTION_CURRENTRX
OS_OVR_VA: 20/
OS_VPRISM_DIRECTION: BF
OD_SPHERE: PLANO
OD_CYLINDER: -0.50
OS_SPHERE: +0.50
OD_AXIS: 042
OD_VPRISM_DIRECTION: BF
OD_ADD: +2.25
OS_CYLINDER: 0.00
OS_AXIS: 000
OD_OVR_VA: 20/
OS_ADD: +2.50

## 2023-09-09 ASSESSMENT — KERATOMETRY
OS_AXISANGLE_DEGREES: 080
OS_K1POWER_DIOPTERS: 45.25
OD_K1POWER_DIOPTERS: 44.75
OD_K2POWER_DIOPTERS: 46.00
OS_K2POWER_DIOPTERS: 46.00
METHOD_AUTO_MANUAL: AUTO
OD_AXISANGLE_DEGREES: 062

## 2023-09-09 ASSESSMENT — AXIALLENGTH_DERIVED
OD_AL: 23.1549
OS_AL: 22.9741

## 2023-09-09 ASSESSMENT — VISUAL ACUITY
OS_BCVA: 20/20
OD_BCVA: 20/20

## 2023-09-09 ASSESSMENT — SUPERFICIAL PUNCTATE KERATITIS (SPK)
OS_SPK: 1+
OD_SPK: 1+

## 2023-09-09 ASSESSMENT — CORNEAL PTERYGIUM: OD_PTERYGIUM: TEMPORAL

## 2023-09-09 ASSESSMENT — PACHYMETRY
OS_CT_CORRECTION: -6
OD_CT_CORRECTION: -5
OS_CT_UM: 626
OD_CT_UM: 614

## 2023-09-09 ASSESSMENT — SPHEQUIV_DERIVED
OS_SPHEQUIV: -0.375
OD_SPHEQUIV: -0.625

## 2023-09-09 ASSESSMENT — REFRACTION_AUTOREFRACTION
OS_CYLINDER: -0.75
OD_CYLINDER: -0.25
OD_AXIS: 024
OD_SPHERE: -0.50
OS_SPHERE: 0.00
OS_AXIS: 003

## 2023-09-09 ASSESSMENT — CONFRONTATIONAL VISUAL FIELD TEST (CVF)
OD_FINDINGS: FULL
OS_FINDINGS: FULL

## 2023-09-10 ENCOUNTER — RX RENEWAL (OUTPATIENT)
Age: 58
End: 2023-09-10

## 2023-10-07 ENCOUNTER — OFFICE (OUTPATIENT)
Dept: URBAN - METROPOLITAN AREA CLINIC 94 | Facility: CLINIC | Age: 58
Setting detail: OPHTHALMOLOGY
End: 2023-10-07
Payer: MEDICAID

## 2023-10-07 DIAGNOSIS — H02.421: ICD-10-CM

## 2023-10-07 DIAGNOSIS — H02.423: ICD-10-CM

## 2023-10-07 DIAGNOSIS — H02.521: ICD-10-CM

## 2023-10-07 DIAGNOSIS — H16.223: ICD-10-CM

## 2023-10-07 DIAGNOSIS — H02.422: ICD-10-CM

## 2023-10-07 DIAGNOSIS — H02.524: ICD-10-CM

## 2023-10-07 PROCEDURE — 99024 POSTOP FOLLOW-UP VISIT: CPT | Performed by: REGISTERED NURSE

## 2023-10-07 ASSESSMENT — KERATOMETRY
OD_K2POWER_DIOPTERS: 45.50
OS_K2POWER_DIOPTERS: 45.75
OS_K1POWER_DIOPTERS: 45.00
METHOD_AUTO_MANUAL: AUTO
OD_K1POWER_DIOPTERS: 45.00
OD_AXISANGLE_DEGREES: 099
OS_AXISANGLE_DEGREES: 170

## 2023-10-07 ASSESSMENT — REFRACTION_MANIFEST
OS_VA2: 20/20
OS_CYLINDER: SPH
OS_ADD: +2.50
OD_ADD: +2.50
OD_CYLINDER: SPH
OD_SPHERE: PLANO
OD_ADD: +2.75
OS_SPHERE: +0.75
OS_CYLINDER: SPH
OD_CYLINDER: SPH
OS_ADD: +2.75
OS_SPHERE: PLANO
OS_VA1: 20/40
OD_VA1: 20/40
OD_VA2: 20/20
OS_VA1: 20/20
OD_SPHERE: +1.25
OD_SPHERE: PLANO
OS_SPHERE: +1.25
OS_CYLINDER: SPHERE
OD_CYLINDER: SPHERE
OD_VA1: 20/20

## 2023-10-07 ASSESSMENT — REFRACTION_CURRENTRX
OD_CYLINDER: -0.50
OS_CYLINDER: 0.00
OS_AXIS: 000
OS_VPRISM_DIRECTION: BF
OD_ADD: +2.25
OD_AXIS: 042
OD_OVR_VA: 20/
OD_VPRISM_DIRECTION: BF
OD_SPHERE: PLANO
OS_OVR_VA: 20/
OS_SPHERE: +0.50
OS_ADD: +2.50

## 2023-10-07 ASSESSMENT — SUPERFICIAL PUNCTATE KERATITIS (SPK)
OD_SPK: 1+
OS_SPK: 1+

## 2023-10-07 ASSESSMENT — PACHYMETRY
OS_CT_UM: 626
OD_CT_CORRECTION: -5
OS_CT_CORRECTION: -6
OD_CT_UM: 614

## 2023-10-07 ASSESSMENT — REFRACTION_AUTOREFRACTION
OS_SPHERE: +0.50
OD_CYLINDER: -0.25
OD_AXIS: 050
OS_CYLINDER: -0.25
OD_SPHERE: 0.00
OS_AXIS: 083

## 2023-10-07 ASSESSMENT — AXIALLENGTH_DERIVED
OD_AL: 23.0124
OS_AL: 22.7856

## 2023-10-07 ASSESSMENT — CORNEAL PTERYGIUM: OD_PTERYGIUM: TEMPORAL

## 2023-10-07 ASSESSMENT — VISUAL ACUITY
OS_BCVA: 20/20
OD_BCVA: 20/20

## 2023-10-07 ASSESSMENT — CONFRONTATIONAL VISUAL FIELD TEST (CVF)
OS_FINDINGS: FULL
OD_FINDINGS: FULL

## 2023-10-07 ASSESSMENT — SPHEQUIV_DERIVED
OS_SPHEQUIV: 0.375
OD_SPHEQUIV: -0.125

## 2023-10-11 ENCOUNTER — RX RENEWAL (OUTPATIENT)
Age: 58
End: 2023-10-11

## 2023-10-24 ENCOUNTER — RX RENEWAL (OUTPATIENT)
Age: 58
End: 2023-10-24

## 2023-10-30 ENCOUNTER — APPOINTMENT (OUTPATIENT)
Dept: CARDIOLOGY | Facility: CLINIC | Age: 58
End: 2023-10-30

## 2023-11-02 ENCOUNTER — APPOINTMENT (OUTPATIENT)
Dept: CARDIOLOGY | Facility: CLINIC | Age: 58
End: 2023-11-02
Payer: MEDICAID

## 2023-11-02 PROCEDURE — 93015 CV STRESS TEST SUPVJ I&R: CPT

## 2023-11-02 PROCEDURE — 93306 TTE W/DOPPLER COMPLETE: CPT

## 2023-11-05 ENCOUNTER — RX RENEWAL (OUTPATIENT)
Age: 58
End: 2023-11-05

## 2023-11-16 ENCOUNTER — RX RENEWAL (OUTPATIENT)
Age: 58
End: 2023-11-16

## 2023-11-16 RX ORDER — TRIAMCINOLONE ACETONIDE 1 MG/G
0.1 CREAM TOPICAL TWICE DAILY
Qty: 80 | Refills: 2 | Status: ACTIVE | COMMUNITY
Start: 2023-08-14 | End: 1900-01-01

## 2023-11-21 ENCOUNTER — APPOINTMENT (OUTPATIENT)
Dept: CARDIOLOGY | Facility: CLINIC | Age: 58
End: 2023-11-21

## 2023-12-02 ENCOUNTER — OFFICE (OUTPATIENT)
Dept: URBAN - METROPOLITAN AREA CLINIC 112 | Facility: CLINIC | Age: 58
Setting detail: OPHTHALMOLOGY
End: 2023-12-02
Payer: MEDICAID

## 2023-12-02 ENCOUNTER — RX ONLY (RX ONLY)
Age: 58
End: 2023-12-02

## 2023-12-02 DIAGNOSIS — H40.053: ICD-10-CM

## 2023-12-02 DIAGNOSIS — H26.493: ICD-10-CM

## 2023-12-02 PROCEDURE — 92250 FUNDUS PHOTOGRAPHY W/I&R: CPT | Performed by: OPHTHALMOLOGY

## 2023-12-02 PROCEDURE — 99214 OFFICE O/P EST MOD 30 MIN: CPT | Performed by: OPHTHALMOLOGY

## 2023-12-02 PROCEDURE — 92083 EXTENDED VISUAL FIELD XM: CPT | Performed by: OPHTHALMOLOGY

## 2023-12-02 ASSESSMENT — REFRACTION_CURRENTRX
OS_AXIS: 000
OS_ADD: +2.50
OD_VPRISM_DIRECTION: BF
OD_OVR_VA: 20/
OS_VPRISM_DIRECTION: BF
OD_ADD: +2.25
OS_CYLINDER: 0.00
OS_SPHERE: +0.50
OD_SPHERE: PLANO
OD_AXIS: 042
OD_CYLINDER: -0.50
OS_OVR_VA: 20/

## 2023-12-02 ASSESSMENT — SPHEQUIV_DERIVED
OD_SPHEQUIV: -0.125
OS_SPHEQUIV: 0.375
OD_SPHEQUIV: -0.125
OS_SPHEQUIV: 0.375

## 2023-12-02 ASSESSMENT — REFRACTION_MANIFEST
OS_CYLINDER: SPH
OS_AXIS: 083
OS_ADD: +2.75
OD_VA2: 20/20
OS_CYLINDER: SPHERE
OD_AXIS: 050
OS_VA1: 20/40
OD_CYLINDER: SPH
OD_VA1: 20/20
OS_SPHERE: +0.50
OS_SPHERE: +1.25
OD_SPHERE: 0.00
OS_VA2: 20/20
OS_CYLINDER: -0.25
OD_VA1: 20/40
OD_ADD: +2.75
OS_VA1: 20/20
OD_CYLINDER: SPH
OD_ADD: +2.50
OS_ADD: +2.50
OD_CYLINDER: -0.25
OD_SPHERE: +1.25
OS_CYLINDER: SPH
OD_CYLINDER: SPHERE
OS_VA1: 20/20
OS_SPHERE: +0.75
OD_SPHERE: PLANO
OD_VA1: 20/20
OD_SPHERE: PLANO
OS_SPHERE: PLANO

## 2023-12-02 ASSESSMENT — SUPERFICIAL PUNCTATE KERATITIS (SPK)
OD_SPK: 1+
OS_SPK: 1+

## 2023-12-02 ASSESSMENT — REFRACTION_AUTOREFRACTION
OD_AXIS: 050
OD_SPHERE: 0.00
OD_CYLINDER: -0.25
OS_SPHERE: +0.50
OS_CYLINDER: -0.25
OS_AXIS: 083

## 2023-12-02 ASSESSMENT — CONFRONTATIONAL VISUAL FIELD TEST (CVF)
OD_FINDINGS: FULL
OS_FINDINGS: FULL

## 2023-12-02 ASSESSMENT — CORNEAL PTERYGIUM: OD_PTERYGIUM: TEMPORAL

## 2023-12-06 ENCOUNTER — RX RENEWAL (OUTPATIENT)
Age: 58
End: 2023-12-06

## 2023-12-27 ENCOUNTER — APPOINTMENT (OUTPATIENT)
Dept: INTERNAL MEDICINE | Facility: CLINIC | Age: 58
End: 2023-12-27
Payer: MEDICAID

## 2023-12-27 VITALS
RESPIRATION RATE: 12 BRPM | BODY MASS INDEX: 21.62 KG/M2 | HEART RATE: 72 BPM | WEIGHT: 122 LBS | SYSTOLIC BLOOD PRESSURE: 140 MMHG | DIASTOLIC BLOOD PRESSURE: 78 MMHG | HEIGHT: 63 IN

## 2023-12-27 DIAGNOSIS — L98.9 DISORDER OF THE SKIN AND SUBCUTANEOUS TISSUE, UNSPECIFIED: ICD-10-CM

## 2023-12-27 DIAGNOSIS — J00 ACUTE NASOPHARYNGITIS [COMMON COLD]: ICD-10-CM

## 2023-12-27 PROCEDURE — G0444 DEPRESSION SCREEN ANNUAL: CPT | Mod: 59

## 2023-12-27 PROCEDURE — 99214 OFFICE O/P EST MOD 30 MIN: CPT | Mod: 25

## 2023-12-27 PROCEDURE — 36415 COLL VENOUS BLD VENIPUNCTURE: CPT

## 2023-12-27 RX ORDER — AZELASTINE HYDROCHLORIDE 137 UG/1
0.1 SPRAY, METERED NASAL TWICE DAILY
Qty: 1 | Refills: 4 | Status: ACTIVE | COMMUNITY
Start: 2023-12-27 | End: 1900-01-01

## 2023-12-27 NOTE — HEALTH RISK ASSESSMENT
[No] : No [No falls in past year] : Patient reported no falls in the past year [Little interest or pleasure doing things] : 1) Little interest or pleasure doing things [Feeling down, depressed, or hopeless] : 2) Feeling down, depressed, or hopeless [0] : 2) Feeling down, depressed, or hopeless: Not at all (0) [PHQ-2 Negative - No further assessment needed] : PHQ-2 Negative - No further assessment needed [[id number]] : 0 [Current] : Current

## 2023-12-27 NOTE — HISTORY OF PRESENT ILLNESS
[FreeTextEntry1] : follow up  [de-identified] : follow up has runnynose today no fever no sob smokes has hld, htn had normal stress test still smoking

## 2023-12-27 NOTE — ASSESSMENT
[FreeTextEntry1] : rhinitis azelastine bp stable hld to be checked quit smoking skin issue see derm due for mammo  and gyn evalutaiton

## 2023-12-28 ENCOUNTER — ASC (OUTPATIENT)
Dept: URBAN - METROPOLITAN AREA SURGERY 8 | Facility: SURGERY | Age: 58
Setting detail: OPHTHALMOLOGY
End: 2023-12-28
Payer: MEDICAID

## 2023-12-28 DIAGNOSIS — H26.491: ICD-10-CM

## 2023-12-28 LAB
CHOLEST SERPL-MCNC: 157 MG/DL
HDLC SERPL-MCNC: 29 MG/DL
LDLC SERPL CALC-MCNC: 95 MG/DL
NONHDLC SERPL-MCNC: 128 MG/DL
TRIGL SERPL-MCNC: 188 MG/DL

## 2023-12-28 PROCEDURE — 66821 AFTER CATARACT LASER SURGERY: CPT | Mod: RT | Performed by: OPHTHALMOLOGY

## 2023-12-28 ASSESSMENT — REFRACTION_MANIFEST
OS_CYLINDER: SPHERE
OD_CYLINDER: -0.25
OD_SPHERE: 0.00
OS_ADD: +2.50
OD_CYLINDER: SPH
OD_ADD: +2.75
OS_CYLINDER: -0.25
OD_VA2: 20/20
OS_SPHERE: +1.25
OD_CYLINDER: SPH
OD_SPHERE: +1.25
OS_VA1: 20/40
OS_CYLINDER: SPH
OS_ADD: +2.75
OS_VA2: 20/20
OS_VA1: 20/20
OD_CYLINDER: SPHERE
OS_AXIS: 083
OS_SPHERE: +0.75
OD_ADD: +2.50
OD_VA1: 20/40
OS_VA1: 20/20
OS_SPHERE: PLANO
OD_VA1: 20/20
OD_SPHERE: PLANO
OD_AXIS: 050
OD_VA1: 20/20
OS_SPHERE: +0.50
OS_CYLINDER: SPH
OD_SPHERE: PLANO

## 2023-12-28 ASSESSMENT — REFRACTION_AUTOREFRACTION
OD_SPHERE: 0.00
OD_CYLINDER: -0.25
OS_AXIS: 083
OS_CYLINDER: -0.25
OD_AXIS: 050
OS_SPHERE: +0.50

## 2023-12-28 ASSESSMENT — REFRACTION_CURRENTRX
OS_AXIS: 000
OD_CYLINDER: -0.50
OS_CYLINDER: 0.00
OD_ADD: +2.25
OD_SPHERE: PLANO
OS_SPHERE: +0.50
OS_ADD: +2.50
OS_OVR_VA: 20/
OD_VPRISM_DIRECTION: BF
OS_VPRISM_DIRECTION: BF
OD_AXIS: 042
OD_OVR_VA: 20/

## 2023-12-28 ASSESSMENT — SPHEQUIV_DERIVED
OD_SPHEQUIV: -0.125
OD_SPHEQUIV: -0.125
OS_SPHEQUIV: 0.375
OS_SPHEQUIV: 0.375

## 2023-12-29 ENCOUNTER — OFFICE (OUTPATIENT)
Dept: URBAN - METROPOLITAN AREA CLINIC 94 | Facility: CLINIC | Age: 58
Setting detail: OPHTHALMOLOGY
End: 2023-12-29
Payer: MEDICAID

## 2023-12-29 DIAGNOSIS — H26.492: ICD-10-CM

## 2023-12-29 PROCEDURE — 66821 AFTER CATARACT LASER SURGERY: CPT | Mod: 79,LT | Performed by: OPHTHALMOLOGY

## 2023-12-29 ASSESSMENT — REFRACTION_CURRENTRX
OS_OVR_VA: 20/
OS_AXIS: 000
OS_CYLINDER: 0.00
OS_VPRISM_DIRECTION: BF
OS_SPHERE: +0.50
OD_VPRISM_DIRECTION: BF
OD_AXIS: 042
OD_SPHERE: PLANO
OD_OVR_VA: 20/
OS_ADD: +2.50
OD_ADD: +2.25
OD_CYLINDER: -0.50

## 2023-12-29 ASSESSMENT — SUPERFICIAL PUNCTATE KERATITIS (SPK)
OD_SPK: 1+
OS_SPK: 1+

## 2023-12-29 ASSESSMENT — REFRACTION_MANIFEST
OS_CYLINDER: SPH
OD_SPHERE: PLANO
OD_CYLINDER: SPHERE
OD_CYLINDER: -0.25
OD_SPHERE: PLANO
OS_AXIS: 083
OS_SPHERE: +1.25
OS_VA1: 20/40
OD_VA1: 20/40
OD_CYLINDER: SPH
OS_ADD: +2.75
OS_SPHERE: +0.75
OS_CYLINDER: -0.25
OS_CYLINDER: SPHERE
OD_CYLINDER: SPH
OD_VA2: 20/20
OS_SPHERE: +0.50
OD_ADD: +2.75
OS_CYLINDER: SPH
OD_SPHERE: +1.25
OS_ADD: +2.50
OS_VA1: 20/20
OD_AXIS: 050
OD_SPHERE: 0.00
OD_VA1: 20/20
OS_VA2: 20/20
OS_SPHERE: PLANO
OS_VA1: 20/20
OD_VA1: 20/20
OD_ADD: +2.50

## 2023-12-29 ASSESSMENT — REFRACTION_AUTOREFRACTION
OS_CYLINDER: -0.25
OS_SPHERE: +0.50
OD_CYLINDER: -0.25
OS_AXIS: 083
OD_AXIS: 050
OD_SPHERE: 0.00

## 2023-12-29 ASSESSMENT — SPHEQUIV_DERIVED
OS_SPHEQUIV: 0.375
OD_SPHEQUIV: -0.125
OD_SPHEQUIV: -0.125
OS_SPHEQUIV: 0.375

## 2023-12-29 ASSESSMENT — CORNEAL PTERYGIUM: OD_PTERYGIUM: TEMPORAL

## 2024-02-03 ENCOUNTER — OFFICE (OUTPATIENT)
Dept: URBAN - METROPOLITAN AREA CLINIC 112 | Facility: CLINIC | Age: 59
Setting detail: OPHTHALMOLOGY
End: 2024-02-03
Payer: MEDICAID

## 2024-02-03 DIAGNOSIS — Z96.1: ICD-10-CM

## 2024-02-03 PROCEDURE — 99024 POSTOP FOLLOW-UP VISIT: CPT | Performed by: OPHTHALMOLOGY

## 2024-02-03 ASSESSMENT — REFRACTION_AUTOREFRACTION
OD_AXIS: 050
OD_SPHERE: 0.00
OS_AXIS: 089
OS_CYLINDER: -0.25
OS_SPHERE: +0.25
OD_CYLINDER: -0.25

## 2024-02-03 ASSESSMENT — REFRACTION_MANIFEST
OS_SPHERE: PLANO
OD_VA2: 20/20
OD_ADD: +2.50
OS_SPHERE: +0.50
OD_VA1: 20/40
OS_SPHERE: +1.25
OS_CYLINDER: SPH
OS_SPHERE: +0.75
OS_CYLINDER: -0.25
OS_VA1: 20/20
OS_VA2: 20/20
OS_VA1: 20/40
OD_VA1: 20/20
OD_SPHERE: +1.25
OS_AXIS: 083
OS_ADD: +2.75
OD_VA1: 20/20
OD_ADD: +2.75
OS_CYLINDER: SPHERE
OD_CYLINDER: SPH
OD_AXIS: 050
OD_CYLINDER: SPH
OD_SPHERE: PLANO
OS_ADD: +2.50
OD_SPHERE: PLANO
OS_CYLINDER: SPH
OS_VA1: 20/20
OD_CYLINDER: SPHERE
OD_CYLINDER: -0.25
OD_SPHERE: 0.00

## 2024-02-03 ASSESSMENT — REFRACTION_CURRENTRX
OS_OVR_VA: 20/
OD_OVR_VA: 20/
OS_CYLINDER: 0.00
OD_ADD: +2.25
OS_ADD: +2.50
OD_CYLINDER: -0.50
OD_AXIS: 042
OD_VPRISM_DIRECTION: BF
OS_AXIS: 000
OS_SPHERE: +0.50
OS_VPRISM_DIRECTION: BF
OD_SPHERE: PLANO

## 2024-02-03 ASSESSMENT — SPHEQUIV_DERIVED
OS_SPHEQUIV: 0.125
OD_SPHEQUIV: -0.125
OD_SPHEQUIV: -0.125
OS_SPHEQUIV: 0.375

## 2024-02-03 ASSESSMENT — CONFRONTATIONAL VISUAL FIELD TEST (CVF)
OS_FINDINGS: FULL
OD_FINDINGS: FULL

## 2024-02-03 ASSESSMENT — CORNEAL PTERYGIUM: OD_PTERYGIUM: TEMPORAL

## 2024-02-03 ASSESSMENT — SUPERFICIAL PUNCTATE KERATITIS (SPK)
OS_SPK: 1+
OD_SPK: 1+

## 2024-02-10 ENCOUNTER — OFFICE (OUTPATIENT)
Dept: URBAN - METROPOLITAN AREA CLINIC 112 | Facility: CLINIC | Age: 59
Setting detail: OPHTHALMOLOGY
End: 2024-02-10
Payer: MEDICAID

## 2024-02-10 DIAGNOSIS — H02.825: ICD-10-CM

## 2024-02-10 PROCEDURE — 99024 POSTOP FOLLOW-UP VISIT: CPT | Mod: 25 | Performed by: OPHTHALMOLOGY

## 2024-02-10 ASSESSMENT — CONFRONTATIONAL VISUAL FIELD TEST (CVF)
OD_FINDINGS: FULL
OS_FINDINGS: FULL

## 2024-02-10 ASSESSMENT — SUPERFICIAL PUNCTATE KERATITIS (SPK)
OD_SPK: 1+
OS_SPK: 1+

## 2024-02-10 ASSESSMENT — CORNEAL PTERYGIUM: OD_PTERYGIUM: TEMPORAL

## 2024-02-12 ASSESSMENT — SPHEQUIV_DERIVED
OD_SPHEQUIV: -0.125
OS_SPHEQUIV: 0.125

## 2024-02-12 ASSESSMENT — REFRACTION_AUTOREFRACTION
OD_AXIS: 050
OS_AXIS: 089
OS_CYLINDER: -0.25
OD_SPHERE: 0.00
OD_CYLINDER: -0.25
OS_SPHERE: +0.25

## 2024-02-26 ENCOUNTER — RX RENEWAL (OUTPATIENT)
Age: 59
End: 2024-02-26

## 2024-03-02 ENCOUNTER — OFFICE (OUTPATIENT)
Dept: URBAN - METROPOLITAN AREA CLINIC 112 | Facility: CLINIC | Age: 59
Setting detail: OPHTHALMOLOGY
End: 2024-03-02
Payer: MEDICAID

## 2024-03-02 DIAGNOSIS — H02.825: ICD-10-CM

## 2024-03-02 PROCEDURE — SCCOS COSMETIC CONSULTATION: Performed by: OPHTHALMOLOGY

## 2024-03-02 PROCEDURE — 99213 OFFICE O/P EST LOW 20 MIN: CPT | Mod: 24 | Performed by: OPHTHALMOLOGY

## 2024-03-02 PROCEDURE — 92285 EXTERNAL OCULAR PHOTOGRAPHY: CPT | Performed by: OPHTHALMOLOGY

## 2024-03-24 ENCOUNTER — RX RENEWAL (OUTPATIENT)
Age: 59
End: 2024-03-24

## 2024-04-05 DIAGNOSIS — M25.542 PAIN IN JOINTS OF RIGHT HAND: ICD-10-CM

## 2024-04-05 DIAGNOSIS — M25.541 PAIN IN JOINTS OF RIGHT HAND: ICD-10-CM

## 2024-04-10 ENCOUNTER — APPOINTMENT (OUTPATIENT)
Dept: ORTHOPEDIC SURGERY | Facility: CLINIC | Age: 59
End: 2024-04-10

## 2024-04-12 ENCOUNTER — RX RENEWAL (OUTPATIENT)
Age: 59
End: 2024-04-12

## 2024-04-12 RX ORDER — FLUTICASONE PROPIONATE 50 UG/1
50 SPRAY, METERED NASAL DAILY
Qty: 16 | Refills: 4 | Status: ACTIVE | COMMUNITY
Start: 2023-05-19 | End: 1900-01-01

## 2024-04-13 ENCOUNTER — RX ONLY (RX ONLY)
Age: 59
End: 2024-04-13

## 2024-04-13 ENCOUNTER — OFFICE (OUTPATIENT)
Dept: URBAN - METROPOLITAN AREA CLINIC 112 | Facility: CLINIC | Age: 59
Setting detail: OPHTHALMOLOGY
End: 2024-04-13
Payer: MEDICAID

## 2024-04-13 DIAGNOSIS — H02.822: ICD-10-CM

## 2024-04-13 DIAGNOSIS — H02.825: ICD-10-CM

## 2024-04-13 PROCEDURE — 67840 REMOVE EYELID LESION: CPT | Mod: E2 | Performed by: OPHTHALMOLOGY

## 2024-04-13 PROCEDURE — 67840 REMOVE EYELID LESION: CPT | Mod: E4 | Performed by: OPHTHALMOLOGY

## 2024-04-16 ENCOUNTER — APPOINTMENT (OUTPATIENT)
Dept: ORTHOPEDIC SURGERY | Facility: CLINIC | Age: 59
End: 2024-04-16
Payer: COMMERCIAL

## 2024-04-16 DIAGNOSIS — R20.0 ANESTHESIA OF SKIN: ICD-10-CM

## 2024-04-16 PROCEDURE — 99203 OFFICE O/P NEW LOW 30 MIN: CPT

## 2024-04-16 PROCEDURE — 73130 X-RAY EXAM OF HAND: CPT | Mod: 50

## 2024-04-25 PROBLEM — R20.0 HAND NUMBNESS: Status: ACTIVE | Noted: 2024-04-25

## 2024-04-25 NOTE — IMAGING
[de-identified] : Right wrist with no swelling nor erythema. Able to make fist, oppose thumb to small finger and abduct fingers, no overt atrophy. +Phalen's, +tinel at carpal, -tinel at Guyon, -tinel at cubital. -froment, -wartenberg. Intact sensation in median and intact at superficial radial and intact in small and ulnar ring finger(normal at ulnar hand) prior to provocative testing. <2sec cap refill.  Right hand radiographs with no fracture nor dislocation. Carpus aligned. (3-view)   Left wrist with no swelling nor erythema. Able to make fist, oppose thumb to small finger and abduct fingers, no overt atrophy. +Phalen's, +tinel at carpal, -tinel at Guyon, -tinel at cubital. -froment, -wartenberg. Intact sensation in median and intact at superficial radial and intact in small and ulnar ring finger(normal at ulnar hand) prior to provocative testing. <2sec cap refill.  Left hand radiographs with no fracture nor dislocation. Carpus aligned. (3-view)

## 2024-04-25 NOTE — ASSESSMENT
[FreeTextEntry1] : Bilateral CTS - reviewed pathoanatomy. Encouraged nighttime cockup wrist bracing. Will obtain bilateral UE EMG/NCV in light of severity of symptoms - patient will follow-up thereafter to discuss results and develop plan-of-care.  F/u after EMG/NCV

## 2024-04-25 NOTE — HISTORY OF PRESENT ILLNESS
[de-identified] : 58F, RHD presents with bilateral hand pain since 2023, no specific cause of injury/ trauma. Right index, middle and thumb locking and left-hand swelling. Seen by PCP who prescribed medication that helped temporarily. Admits to experiencing constant aching pain and numbness/ tingling.

## 2024-04-26 ENCOUNTER — APPOINTMENT (OUTPATIENT)
Dept: RHEUMATOLOGY | Facility: CLINIC | Age: 59
End: 2024-04-26

## 2024-04-29 ENCOUNTER — APPOINTMENT (OUTPATIENT)
Dept: NEUROLOGY | Facility: CLINIC | Age: 59
End: 2024-04-29

## 2024-05-04 ENCOUNTER — OFFICE (OUTPATIENT)
Dept: URBAN - METROPOLITAN AREA CLINIC 112 | Facility: CLINIC | Age: 59
Setting detail: OPHTHALMOLOGY
End: 2024-05-04
Payer: MEDICAID

## 2024-05-04 DIAGNOSIS — H40.053: ICD-10-CM

## 2024-05-04 PROCEDURE — 92020 GONIOSCOPY: CPT | Performed by: OPHTHALMOLOGY

## 2024-05-04 PROCEDURE — 92133 CPTRZD OPH DX IMG PST SGM ON: CPT | Performed by: OPHTHALMOLOGY

## 2024-05-04 PROCEDURE — 92014 COMPRE OPH EXAM EST PT 1/>: CPT | Performed by: OPHTHALMOLOGY

## 2024-05-04 ASSESSMENT — CONFRONTATIONAL VISUAL FIELD TEST (CVF)
OD_FINDINGS: FULL
OS_FINDINGS: FULL

## 2024-05-24 ENCOUNTER — APPOINTMENT (OUTPATIENT)
Dept: RHEUMATOLOGY | Facility: CLINIC | Age: 59
End: 2024-05-24
Payer: COMMERCIAL

## 2024-05-24 VITALS
WEIGHT: 125 LBS | SYSTOLIC BLOOD PRESSURE: 134 MMHG | OXYGEN SATURATION: 97 % | DIASTOLIC BLOOD PRESSURE: 80 MMHG | BODY MASS INDEX: 22.15 KG/M2 | HEART RATE: 79 BPM | TEMPERATURE: 97.5 F | HEIGHT: 63 IN

## 2024-05-24 DIAGNOSIS — M19.041 PRIMARY OSTEOARTHRITIS, RIGHT HAND: ICD-10-CM

## 2024-05-24 DIAGNOSIS — M19.042 PRIMARY OSTEOARTHRITIS, RIGHT HAND: ICD-10-CM

## 2024-05-24 DIAGNOSIS — M25.50 PAIN IN UNSPECIFIED JOINT: ICD-10-CM

## 2024-05-24 PROCEDURE — G2211 COMPLEX E/M VISIT ADD ON: CPT

## 2024-05-24 PROCEDURE — 99204 OFFICE O/P NEW MOD 45 MIN: CPT

## 2024-05-24 NOTE — PHYSICAL EXAM
[General Appearance - Alert] : alert [General Appearance - In No Acute Distress] : in no acute distress [General Appearance - Well Developed] : well developed [General Appearance - Well-Appearing] : healthy appearing [Sclera] : the sclera and conjunctiva were normal [Extraocular Movements] : extraocular movements were intact [Outer Ear] : the ears and nose were normal in appearance [Exaggerated Use Of Accessory Muscles For Inspiration] : no accessory muscle use [] : no respiratory distress [Edema] : there was no peripheral edema [FreeTextEntry1] : erythema below b/l eyelids, pt states this is chronic from sun exposure [No Focal Deficits] : no focal deficits [Oriented To Time, Place, And Person] : oriented to person, place, and time [Affect] : the affect was normal [Mood] : the mood was normal

## 2024-05-24 NOTE — REVIEW OF SYSTEMS
[Arthralgias] : arthralgias [Joint Pain] : joint pain [Joint Stiffness] : joint stiffness [As Noted in HPI] : as noted in HPI [Negative] : Heme/Lymph

## 2024-05-24 NOTE — ASSESSMENT
[FreeTextEntry1] : 58F here for evaluation of 9 months of b/l hand joint pain, mainly in DIP joints with bumps/deformities on DIP joints most prominent on R 2nd DIP joint. She has used naproxen 375 mg BID for past several months, with incomplete relief. She denies swelling in other joints.   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 However, will still check for inflammatory causes of joint pain including SLE, Sjogrens, RA, and will also check TSH. If the above results including inflammatory markers are normal, I would treat the patient for OA.  Xrays of b/l hands was also ordered. noted that she had hand Xrys at orthopedist office in 4/2024 but there was no comment on the DIP joints at the time. For now, I have increased naproxen dose to 500 mg BID, pt advised to take with food, will have to switch to other medication soon as she has been on NSAIDs daily for months now and this could cause GI/renal long term effects.

## 2024-05-24 NOTE — HISTORY OF PRESENT ILLNESS
[FreeTextEntry1] : 58F here for evaluation of 9 months of b/l hand joint pain, mainly in DIP joints with bumps/deformities on DIP joints most prominent on R 2nd DIP joint. She has used naproxen 375 mg BID for past several months, with incomplete relief. She denies swelling in other joints.  She works as a , has some erythema below b/l lower eyelids, states it is from sun exposure.  She reports b/l knee pain as well but no knee swelling. Denies rashes, oral/nasal ulcers, dry eyes or dry mouth, hair loss, weight loss, hematuria, hx of DVT/PE, or Raynauds. No hx of psoriasis or IBD.  Family history: no known autoimmune conditions in family.  [Weight Loss] : no weight loss [Fatigue] : no fatigue [Skin Lesions] : no lesions [Skin Nodules] : no skin nodules [Oral Ulcers] : no oral ulcers [Dry Mouth] : no dry mouth [Arthralgias] : arthralgias [Joint Deformity] : joint deformity [Visual Changes] : no visual changes [Eye Pain] : no eye pain [Eye Redness] : no eye redness [Dry Eyes] : no dry eyes

## 2024-05-28 ENCOUNTER — OFFICE (OUTPATIENT)
Dept: URBAN - METROPOLITAN AREA CLINIC 112 | Facility: CLINIC | Age: 59
Setting detail: OPHTHALMOLOGY
End: 2024-05-28
Payer: MEDICAID

## 2024-05-28 DIAGNOSIS — H02.822: ICD-10-CM

## 2024-05-28 DIAGNOSIS — H02.825: ICD-10-CM

## 2024-05-28 PROCEDURE — 92012 INTRM OPH EXAM EST PATIENT: CPT | Performed by: OPHTHALMOLOGY

## 2024-05-28 ASSESSMENT — CONFRONTATIONAL VISUAL FIELD TEST (CVF)
OD_FINDINGS: FULL
OS_FINDINGS: FULL

## 2024-06-04 ASSESSMENT — KERATOMETRY
METHOD_AUTO_MANUAL: AUTO
OS_K1POWER_DIOPTERS: 45.25
OD_K1POWER_DIOPTERS: 45.00
OD_K2POWER_DIOPTERS: 45.50
OD_AXISANGLE_DEGREES: 108
OS_K2POWER_DIOPTERS: 46.00
OS_AXISANGLE_DEGREES: 089

## 2024-06-07 ENCOUNTER — RX RENEWAL (OUTPATIENT)
Age: 59
End: 2024-06-07

## 2024-06-07 RX ORDER — ONDANSETRON 4 MG/1
4 TABLET ORAL
Qty: 21 | Refills: 3 | Status: ACTIVE | COMMUNITY
Start: 2023-05-19 | End: 1900-01-01

## 2024-06-19 ENCOUNTER — APPOINTMENT (OUTPATIENT)
Dept: INTERNAL MEDICINE | Facility: CLINIC | Age: 59
End: 2024-06-19
Payer: COMMERCIAL

## 2024-06-19 VITALS
DIASTOLIC BLOOD PRESSURE: 78 MMHG | BODY MASS INDEX: 22.15 KG/M2 | SYSTOLIC BLOOD PRESSURE: 128 MMHG | HEIGHT: 63 IN | WEIGHT: 125 LBS | HEART RATE: 68 BPM

## 2024-06-19 DIAGNOSIS — I10 ESSENTIAL (PRIMARY) HYPERTENSION: ICD-10-CM

## 2024-06-19 DIAGNOSIS — R73.03 PREDIABETES.: ICD-10-CM

## 2024-06-19 DIAGNOSIS — M25.542 PAIN IN JOINTS OF RIGHT HAND: ICD-10-CM

## 2024-06-19 DIAGNOSIS — E78.5 HYPERLIPIDEMIA, UNSPECIFIED: ICD-10-CM

## 2024-06-19 DIAGNOSIS — R05.3 CHRONIC COUGH: ICD-10-CM

## 2024-06-19 DIAGNOSIS — M25.541 PAIN IN JOINTS OF RIGHT HAND: ICD-10-CM

## 2024-06-19 PROCEDURE — G2211 COMPLEX E/M VISIT ADD ON: CPT

## 2024-06-19 PROCEDURE — 99214 OFFICE O/P EST MOD 30 MIN: CPT

## 2024-06-19 PROCEDURE — 36415 COLL VENOUS BLD VENIPUNCTURE: CPT

## 2024-06-19 RX ORDER — NAPROXEN 375 MG/1
375 TABLET ORAL TWICE DAILY
Qty: 60 | Refills: 4 | Status: DISCONTINUED | COMMUNITY
Start: 2023-08-14 | End: 2024-06-19

## 2024-06-19 RX ORDER — PRAVASTATIN SODIUM 40 MG/1
40 TABLET ORAL DAILY
Qty: 90 | Refills: 3 | Status: ACTIVE | COMMUNITY
Start: 1900-01-01 | End: 1900-01-01

## 2024-06-19 RX ORDER — AMITRIPTYLINE HYDROCHLORIDE 10 MG/1
10 TABLET, FILM COATED ORAL
Qty: 30 | Refills: 2 | Status: ACTIVE | COMMUNITY
Start: 2023-05-19 | End: 1900-01-01

## 2024-06-19 RX ORDER — AMLODIPINE BESYLATE 5 MG/1
5 TABLET ORAL DAILY
Qty: 90 | Refills: 2 | Status: ACTIVE | COMMUNITY
Start: 1900-01-01 | End: 1900-01-01

## 2024-06-19 RX ORDER — NAPROXEN 500 MG/1
500 TABLET ORAL
Qty: 60 | Refills: 2 | Status: ACTIVE | COMMUNITY
Start: 2024-05-24 | End: 1900-01-01

## 2024-06-19 RX ORDER — BROMPHENIRAMINE MALEATE, PSEUDOEPHEDRINE HYDROCHLORIDE, 2; 30; 10 MG/5ML; MG/5ML; MG/5ML
30-2-10 SYRUP ORAL
Qty: 1 | Refills: 2 | Status: ACTIVE | COMMUNITY
Start: 2024-06-19 | End: 1900-01-01

## 2024-06-19 NOTE — HEALTH RISK ASSESSMENT
[Yes] : Yes [Monthly or less (1 pt)] : Monthly or less (1 point) [No falls in past year] : Patient reported no falls in the past year [Little interest or pleasure doing things] : 1) Little interest or pleasure doing things [Feeling down, depressed, or hopeless] : 2) Feeling down, depressed, or hopeless [0] : 2) Feeling down, depressed, or hopeless: Not at all (0) [PHQ-2 Negative - No further assessment needed] : PHQ-2 Negative - No further assessment needed [[id number]] : 0 [Current] : Current [10-14] : 10-14

## 2024-06-19 NOTE — ASSESSMENT
[FreeTextEntry1] : predm stable htn stable hld to be checked quit smoking cough bromfed mild uri likely

## 2024-06-19 NOTE — HISTORY OF PRESENT ILLNESS
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

## 2024-06-20 ENCOUNTER — LABORATORY RESULT (OUTPATIENT)
Age: 59
End: 2024-06-20

## 2024-06-20 LAB
ALBUMIN SERPL ELPH-MCNC: 4.6 G/DL
ALP BLD-CCNC: 108 U/L
ALT SERPL-CCNC: 25 U/L
ANION GAP SERPL CALC-SCNC: 14 MMOL/L
AST SERPL-CCNC: 25 U/L
BASOPHILS # BLD AUTO: 0.07 K/UL
BASOPHILS NFR BLD AUTO: 0.8 %
BILIRUB SERPL-MCNC: 0.2 MG/DL
BUN SERPL-MCNC: 7 MG/DL
CALCIUM SERPL-MCNC: 9.6 MG/DL
CHLORIDE SERPL-SCNC: 97 MMOL/L
CHOLEST SERPL-MCNC: 178 MG/DL
CO2 SERPL-SCNC: 22 MMOL/L
CREAT SERPL-MCNC: 0.7 MG/DL
EGFR: 101 ML/MIN/1.73M2
EOSINOPHIL # BLD AUTO: 0.32 K/UL
EOSINOPHIL NFR BLD AUTO: 3.6 %
ESTIMATED AVERAGE GLUCOSE: 117 MG/DL
GLUCOSE SERPL-MCNC: 99 MG/DL
HBA1C MFR BLD HPLC: 5.7 %
HCT VFR BLD CALC: 37.1 %
HDLC SERPL-MCNC: 43 MG/DL
HGB BLD-MCNC: 12.6 G/DL
IMM GRANULOCYTES NFR BLD AUTO: 0.1 %
LDLC SERPL CALC-MCNC: 111 MG/DL
LYMPHOCYTES # BLD AUTO: 2.72 K/UL
LYMPHOCYTES NFR BLD AUTO: 30.3 %
MAN DIFF?: NORMAL
MCHC RBC-ENTMCNC: 33.6 PG
MCHC RBC-ENTMCNC: 34 GM/DL
MCV RBC AUTO: 98.9 FL
MONOCYTES # BLD AUTO: 0.61 K/UL
MONOCYTES NFR BLD AUTO: 6.8 %
NEUTROPHILS # BLD AUTO: 5.25 K/UL
NEUTROPHILS NFR BLD AUTO: 58.4 %
NONHDLC SERPL-MCNC: 136 MG/DL
PLATELET # BLD AUTO: 328 K/UL
POTASSIUM SERPL-SCNC: 4.4 MMOL/L
PROT SERPL-MCNC: 7.2 G/DL
RBC # BLD: 3.75 M/UL
RBC # FLD: 12.4 %
SODIUM SERPL-SCNC: 132 MMOL/L
TRIGL SERPL-MCNC: 140 MG/DL
WBC # FLD AUTO: 8.98 K/UL

## 2024-06-26 LAB
25(OH)D3 SERPL-MCNC: 39.3 NG/ML
ALBUMIN SERPL ELPH-MCNC: 4.4 G/DL
ALP BLD-CCNC: 103 U/L
ALT SERPL-CCNC: 23 U/L
ANA PAT FLD IF-IMP: ABNORMAL
ANA SER IF-ACNC: ABNORMAL
ANION GAP SERPL CALC-SCNC: 15 MMOL/L
APPEARANCE: CLEAR
AST SERPL-CCNC: 22 U/L
BASOPHILS # BLD AUTO: 0.07 K/UL
BASOPHILS NFR BLD AUTO: 0.7 %
BILIRUB SERPL-MCNC: 0.2 MG/DL
BILIRUBIN URINE: NEGATIVE
BLOOD URINE: NEGATIVE
BUN SERPL-MCNC: 6 MG/DL
C3 SERPL-MCNC: 103 MG/DL
C4 SERPL-MCNC: 2 MG/DL
CALCIUM SERPL-MCNC: 9.8 MG/DL
CCP AB SER IA-ACNC: <8 UNITS
CHLORIDE SERPL-SCNC: 99 MMOL/L
CO2 SERPL-SCNC: 21 MMOL/L
COLOR: YELLOW
CREAT SERPL-MCNC: 0.6 MG/DL
CREAT SPEC-SCNC: 24 MG/DL
CREAT/PROT UR: 0.2 RATIO
CRP SERPL-MCNC: <3 MG/L
DSDNA AB SER-ACNC: <1 IU/ML
EGFR: 105 ML/MIN/1.73M2
ENA RNP AB SER IA-ACNC: 0.2 AL
ENA SM AB SER IA-ACNC: <0.2 AL
ENA SS-A AB SER IA-ACNC: <0.2 AL
ENA SS-B AB SER IA-ACNC: <0.2 AL
EOSINOPHIL # BLD AUTO: 0.27 K/UL
EOSINOPHIL NFR BLD AUTO: 2.9 %
ERYTHROCYTE [SEDIMENTATION RATE] IN BLOOD BY WESTERGREN METHOD: 27 MM/HR
G6PD SER-CCNC: 15.1 U/G HGB
GLUCOSE QUALITATIVE U: NEGATIVE MG/DL
GLUCOSE SERPL-MCNC: 97 MG/DL
HCT VFR BLD CALC: 37.2 %
HGB BLD-MCNC: 12.7 G/DL
IMM GRANULOCYTES NFR BLD AUTO: 0.1 %
KETONES URINE: NEGATIVE MG/DL
LEUKOCYTE ESTERASE URINE: ABNORMAL
LYMPHOCYTES # BLD AUTO: 2.9 K/UL
LYMPHOCYTES NFR BLD AUTO: 30.9 %
MAN DIFF?: NORMAL
MCHC RBC-ENTMCNC: 32.8 PG
MCHC RBC-ENTMCNC: 34.1 GM/DL
MCV RBC AUTO: 96.1 FL
MONOCYTES # BLD AUTO: 0.6 K/UL
MONOCYTES NFR BLD AUTO: 6.4 %
NEUTROPHILS # BLD AUTO: 5.55 K/UL
NEUTROPHILS NFR BLD AUTO: 59 %
NITRITE URINE: NEGATIVE
PH URINE: 7
PLATELET # BLD AUTO: 308 K/UL
POTASSIUM SERPL-SCNC: 4.6 MMOL/L
PROT SERPL-MCNC: 6.9 G/DL
PROT UR-MCNC: 6 MG/DL
PROTEIN URINE: NEGATIVE MG/DL
RBC # BLD: 3.87 M/UL
RBC # FLD: 12.3 %
RF+CCP IGG SER-IMP: NEGATIVE
RHEUMATOID FACT SER QL: <10 IU/ML
SODIUM SERPL-SCNC: 135 MMOL/L
SPECIFIC GRAVITY URINE: 1.01
TSH SERPL-ACNC: 1.08 UIU/ML
UROBILINOGEN URINE: 0.2 MG/DL
WBC # FLD AUTO: 9.4 K/UL

## 2024-08-03 ENCOUNTER — RX RENEWAL (OUTPATIENT)
Age: 59
End: 2024-08-03

## 2024-08-22 ENCOUNTER — APPOINTMENT (OUTPATIENT)
Dept: RHEUMATOLOGY | Facility: CLINIC | Age: 59
End: 2024-08-22
Payer: COMMERCIAL

## 2024-08-22 VITALS
HEART RATE: 68 BPM | OXYGEN SATURATION: 98 % | BODY MASS INDEX: 21.79 KG/M2 | TEMPERATURE: 97.6 F | HEIGHT: 63 IN | SYSTOLIC BLOOD PRESSURE: 116 MMHG | DIASTOLIC BLOOD PRESSURE: 74 MMHG | WEIGHT: 123 LBS

## 2024-08-22 DIAGNOSIS — M19.041 PRIMARY OSTEOARTHRITIS, RIGHT HAND: ICD-10-CM

## 2024-08-22 DIAGNOSIS — M19.042 PRIMARY OSTEOARTHRITIS, RIGHT HAND: ICD-10-CM

## 2024-08-22 DIAGNOSIS — M25.541 PAIN IN JOINTS OF RIGHT HAND: ICD-10-CM

## 2024-08-22 DIAGNOSIS — M25.542 PAIN IN JOINTS OF RIGHT HAND: ICD-10-CM

## 2024-08-22 PROCEDURE — 99214 OFFICE O/P EST MOD 30 MIN: CPT

## 2024-08-22 PROCEDURE — G2211 COMPLEX E/M VISIT ADD ON: CPT

## 2024-08-22 NOTE — PHYSICAL EXAM
[General Appearance - Alert] : alert [General Appearance - In No Acute Distress] : in no acute distress [General Appearance - Well Developed] : well developed [General Appearance - Well-Appearing] : healthy appearing [Sclera] : the sclera and conjunctiva were normal [Extraocular Movements] : extraocular movements were intact [Outer Ear] : the ears and nose were normal in appearance [Exaggerated Use Of Accessory Muscles For Inspiration] : no accessory muscle use [Edema] : there was no peripheral edema [] : no rash [No Focal Deficits] : no focal deficits [Oriented To Time, Place, And Person] : oriented to person, place, and time [Affect] : the affect was normal [Mood] : the mood was normal [FreeTextEntry1] : heberdens nodes noted on a few DIP joints most notably R 2nd DIP joint

## 2024-08-22 NOTE — HISTORY OF PRESENT ILLNESS
[___ Month(s) Ago] : [unfilled] month(s) ago [FreeTextEntry1] : 8/2024: Labs from initial visit were unremarkable apart from positive PARI.  Her hand x-rays revealed no significant arthritis.  She still has pain and bumps in her DIP joints.  She takes naproxen twice a day on many days.  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

## 2024-08-22 NOTE — ASSESSMENT
[FreeTextEntry1] : 58F here for followup of one year of b/l hand joint pain, mainly in DIP joints with bumps/deformities on DIP joints most prominent on R 2nd DIP joint. She has used naproxen 375 mg BID for past several months, with incomplete relief. She denies swelling in other joints. Improvement with naproxen 500 mg BID.   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 Autoimmune testing was unremarkable at last visit, with ESR being age appropriate.   Xrays of b/l hands showed no significant arthritic changes.  At this time, I advised patient to limit oral NSAIDs-I advised Tylenol Extra Strength 2 tablets twice a day as needed as well as topical Voltaren gel every 6 hours as needed for bilateral hand pain. I had considered duloxetine for OA treatment however patient is already on amitriptyline and these 2 cannot be combined with each other so duloxetine is not a feasible option at this time.  PT referral for b/l hands was given.  Patient was advised to follow-up in 3 months.  In 3 months we will plan to repeat some blood work including ESR, CBC, CMP.

## 2024-09-28 ENCOUNTER — RX RENEWAL (OUTPATIENT)
Age: 59
End: 2024-09-28

## 2024-10-24 ENCOUNTER — NON-APPOINTMENT (OUTPATIENT)
Age: 59
End: 2024-10-24

## 2024-10-25 ENCOUNTER — APPOINTMENT (OUTPATIENT)
Dept: INTERNAL MEDICINE | Facility: CLINIC | Age: 59
End: 2024-10-25

## 2024-12-06 ENCOUNTER — APPOINTMENT (OUTPATIENT)
Dept: RHEUMATOLOGY | Facility: CLINIC | Age: 59
End: 2024-12-06

## 2024-12-20 ENCOUNTER — RX RENEWAL (OUTPATIENT)
Age: 59
End: 2024-12-20

## 2025-01-03 ENCOUNTER — NON-APPOINTMENT (OUTPATIENT)
Age: 60
End: 2025-01-03

## 2025-01-03 ENCOUNTER — APPOINTMENT (OUTPATIENT)
Dept: INTERNAL MEDICINE | Facility: CLINIC | Age: 60
End: 2025-01-03
Payer: COMMERCIAL

## 2025-01-03 VITALS
RESPIRATION RATE: 12 BRPM | WEIGHT: 123 LBS | HEART RATE: 72 BPM | DIASTOLIC BLOOD PRESSURE: 77 MMHG | BODY MASS INDEX: 21.79 KG/M2 | SYSTOLIC BLOOD PRESSURE: 118 MMHG | HEIGHT: 63 IN

## 2025-01-03 DIAGNOSIS — I10 ESSENTIAL (PRIMARY) HYPERTENSION: ICD-10-CM

## 2025-01-03 DIAGNOSIS — Z00.00 ENCOUNTER FOR GENERAL ADULT MEDICAL EXAMINATION W/OUT ABNORMAL FINDINGS: ICD-10-CM

## 2025-01-03 DIAGNOSIS — F17.200 NICOTINE DEPENDENCE, UNSPECIFIED, UNCOMPLICATED: ICD-10-CM

## 2025-01-03 DIAGNOSIS — E78.5 HYPERLIPIDEMIA, UNSPECIFIED: ICD-10-CM

## 2025-01-03 PROCEDURE — 99396 PREV VISIT EST AGE 40-64: CPT

## 2025-01-03 PROCEDURE — 93000 ELECTROCARDIOGRAM COMPLETE: CPT | Mod: 59

## 2025-01-03 PROCEDURE — G0444 DEPRESSION SCREEN ANNUAL: CPT | Mod: 59

## 2025-01-03 PROCEDURE — 99406 BEHAV CHNG SMOKING 3-10 MIN: CPT

## 2025-01-03 PROCEDURE — 99213 OFFICE O/P EST LOW 20 MIN: CPT | Mod: 25

## 2025-01-03 RX ORDER — VARENICLINE TARTRATE 0.5 (11)-1
0.5 MG X 11 & KIT ORAL
Qty: 1 | Refills: 0 | Status: ACTIVE | COMMUNITY
Start: 2025-01-03 | End: 1900-01-01

## 2025-01-04 LAB
ALBUMIN SERPL ELPH-MCNC: 4.5 G/DL
ALP BLD-CCNC: 108 U/L
ALT SERPL-CCNC: 20 U/L
ANION GAP SERPL CALC-SCNC: 13 MMOL/L
APPEARANCE: CLEAR
AST SERPL-CCNC: 17 U/L
BACTERIA: NEGATIVE /HPF
BASOPHILS # BLD AUTO: 0.08 K/UL
BASOPHILS NFR BLD AUTO: 0.7 %
BILIRUB SERPL-MCNC: 0.2 MG/DL
BILIRUBIN URINE: NEGATIVE
BLOOD URINE: NEGATIVE
BUN SERPL-MCNC: 8 MG/DL
CALCIUM SERPL-MCNC: 10 MG/DL
CAST: 0 /LPF
CHLORIDE SERPL-SCNC: 98 MMOL/L
CHOLEST SERPL-MCNC: 220 MG/DL
CO2 SERPL-SCNC: 25 MMOL/L
COLOR: YELLOW
CREAT SERPL-MCNC: 0.73 MG/DL
CREAT SPEC-SCNC: 20 MG/DL
EGFR: 95 ML/MIN/1.73M2
EOSINOPHIL # BLD AUTO: 0.08 K/UL
EOSINOPHIL NFR BLD AUTO: 0.7 %
EPITHELIAL CELLS: 2 /HPF
ESTIMATED AVERAGE GLUCOSE: 123 MG/DL
GLUCOSE QUALITATIVE U: NEGATIVE MG/DL
GLUCOSE SERPL-MCNC: 95 MG/DL
HBA1C MFR BLD HPLC: 5.9 %
HCT VFR BLD CALC: 41.8 %
HDLC SERPL-MCNC: 52 MG/DL
HGB BLD-MCNC: 14.7 G/DL
IMM GRANULOCYTES NFR BLD AUTO: 0.4 %
KETONES URINE: NEGATIVE MG/DL
LDLC SERPL CALC-MCNC: 143 MG/DL
LEUKOCYTE ESTERASE URINE: NEGATIVE
LYMPHOCYTES # BLD AUTO: 2.38 K/UL
LYMPHOCYTES NFR BLD AUTO: 20.9 %
MAN DIFF?: NORMAL
MCHC RBC-ENTMCNC: 35.2 G/DL
MCHC RBC-ENTMCNC: 35.9 PG
MCV RBC AUTO: 102.2 FL
MICROALBUMIN 24H UR DL<=1MG/L-MCNC: <1.2 MG/DL
MICROALBUMIN/CREAT 24H UR-RTO: NORMAL MG/G
MICROSCOPIC-UA: NORMAL
MONOCYTES # BLD AUTO: 0.8 K/UL
MONOCYTES NFR BLD AUTO: 7 %
NEUTROPHILS # BLD AUTO: 8.02 K/UL
NEUTROPHILS NFR BLD AUTO: 70.3 %
NITRITE URINE: NEGATIVE
NONHDLC SERPL-MCNC: 168 MG/DL
PH URINE: 7
PLATELET # BLD AUTO: 331 K/UL
POTASSIUM SERPL-SCNC: 4.6 MMOL/L
PROT SERPL-MCNC: 7.4 G/DL
PROTEIN URINE: NEGATIVE MG/DL
RBC # BLD: 4.09 M/UL
RBC # FLD: 12.3 %
RED BLOOD CELLS URINE: 0 /HPF
SODIUM SERPL-SCNC: 137 MMOL/L
SPECIFIC GRAVITY URINE: 1
T4 FREE SERPL-MCNC: 1.2 NG/DL
TRIGL SERPL-MCNC: 137 MG/DL
TSH SERPL-ACNC: 1.09 UIU/ML
UROBILINOGEN URINE: 0.2 MG/DL
WBC # FLD AUTO: 11.4 K/UL
WHITE BLOOD CELLS URINE: 0 /HPF

## 2025-01-22 ENCOUNTER — APPOINTMENT (OUTPATIENT)
Dept: INTERNAL MEDICINE | Facility: CLINIC | Age: 60
End: 2025-01-22
Payer: COMMERCIAL

## 2025-01-22 VITALS
RESPIRATION RATE: 12 BRPM | DIASTOLIC BLOOD PRESSURE: 80 MMHG | BODY MASS INDEX: 21.62 KG/M2 | HEIGHT: 63 IN | TEMPERATURE: 97.4 F | OXYGEN SATURATION: 99 % | HEART RATE: 95 BPM | WEIGHT: 122 LBS | SYSTOLIC BLOOD PRESSURE: 137 MMHG

## 2025-01-22 DIAGNOSIS — R44.3 HALLUCINATIONS, UNSPECIFIED: ICD-10-CM

## 2025-01-22 DIAGNOSIS — I10 ESSENTIAL (PRIMARY) HYPERTENSION: ICD-10-CM

## 2025-01-22 DIAGNOSIS — S06.5XAA TRAUMATIC SUBDURAL HEMORRHAGE WITH LOSS OF CONSCIOUSNESS STATUS UNKNOWN, INITIAL ENCOUNTER: ICD-10-CM

## 2025-01-22 DIAGNOSIS — R56.9 UNSPECIFIED CONVULSIONS: ICD-10-CM

## 2025-01-22 PROCEDURE — 99214 OFFICE O/P EST MOD 30 MIN: CPT

## 2025-01-22 PROCEDURE — G2211 COMPLEX E/M VISIT ADD ON: CPT

## 2025-01-22 RX ORDER — OMEPRAZOLE 20 MG/1
20 TABLET, DELAYED RELEASE ORAL DAILY
Refills: 0 | Status: ACTIVE | COMMUNITY
Start: 2025-01-22

## 2025-01-22 RX ORDER — LEVETIRACETAM 500 MG/1
500 TABLET, FILM COATED ORAL TWICE DAILY
Refills: 0 | Status: ACTIVE | COMMUNITY
Start: 2025-01-22

## 2025-01-27 RX ORDER — OLANZAPINE 2.5 MG/1
2.5 TABLET, FILM COATED ORAL TWICE DAILY
Qty: 60 | Refills: 3 | Status: ACTIVE | COMMUNITY
Start: 2025-01-22 | End: 1900-01-01

## 2025-02-26 ENCOUNTER — APPOINTMENT (OUTPATIENT)
Dept: INTERNAL MEDICINE | Facility: CLINIC | Age: 60
End: 2025-02-26
Payer: COMMERCIAL

## 2025-02-26 VITALS
SYSTOLIC BLOOD PRESSURE: 128 MMHG | DIASTOLIC BLOOD PRESSURE: 78 MMHG | HEART RATE: 78 BPM | HEIGHT: 63 IN | WEIGHT: 122 LBS | BODY MASS INDEX: 21.62 KG/M2 | RESPIRATION RATE: 16 BRPM

## 2025-02-26 DIAGNOSIS — R56.9 UNSPECIFIED CONVULSIONS: ICD-10-CM

## 2025-02-26 DIAGNOSIS — I10 ESSENTIAL (PRIMARY) HYPERTENSION: ICD-10-CM

## 2025-02-26 DIAGNOSIS — S06.5XAA TRAUMATIC SUBDURAL HEMORRHAGE WITH LOSS OF CONSCIOUSNESS STATUS UNKNOWN, INITIAL ENCOUNTER: ICD-10-CM

## 2025-02-26 PROCEDURE — 99214 OFFICE O/P EST MOD 30 MIN: CPT

## 2025-02-26 PROCEDURE — G2211 COMPLEX E/M VISIT ADD ON: CPT

## 2025-03-13 ENCOUNTER — APPOINTMENT (OUTPATIENT)
Dept: INTERNAL MEDICINE | Facility: CLINIC | Age: 60
End: 2025-03-13

## 2025-03-18 ENCOUNTER — OFFICE (OUTPATIENT)
Dept: URBAN - METROPOLITAN AREA CLINIC 112 | Facility: CLINIC | Age: 60
Setting detail: OPHTHALMOLOGY
End: 2025-03-18
Payer: COMMERCIAL

## 2025-03-18 DIAGNOSIS — H40.053: ICD-10-CM

## 2025-03-18 DIAGNOSIS — Z96.1: ICD-10-CM

## 2025-03-18 PROCEDURE — 92014 COMPRE OPH EXAM EST PT 1/>: CPT | Performed by: OPHTHALMOLOGY

## 2025-03-18 PROCEDURE — 92083 EXTENDED VISUAL FIELD XM: CPT | Performed by: OPHTHALMOLOGY

## 2025-03-18 PROCEDURE — 92250 FUNDUS PHOTOGRAPHY W/I&R: CPT | Performed by: OPHTHALMOLOGY

## 2025-03-18 ASSESSMENT — REFRACTION_MANIFEST
OD_ADD: +2.75
OS_SPHERE: PLANO
OS_ADD: +2.75
OD_SPHERE: PLANO

## 2025-03-18 ASSESSMENT — KERATOMETRY
OD_AXISANGLE_DEGREES: 122
OD_K2POWER_DIOPTERS: 45.25
OS_AXISANGLE_DEGREES: 087
OD_K1POWER_DIOPTERS: 44.00
METHOD_AUTO_MANUAL: AUTO
OS_K2POWER_DIOPTERS: 45.75
OS_K1POWER_DIOPTERS: 45.00

## 2025-03-18 ASSESSMENT — TONOMETRY
OD_IOP_MMHG: 20
OS_IOP_MMHG: 20

## 2025-03-18 ASSESSMENT — REFRACTION_AUTOREFRACTION
OD_CYLINDER: -0.50
OS_CYLINDER: -0.25
OS_SPHERE: +0.25
OS_AXIS: 152
OD_SPHERE: 0.00
OD_AXIS: 070

## 2025-03-18 ASSESSMENT — SUPERFICIAL PUNCTATE KERATITIS (SPK)
OS_SPK: 1+
OD_SPK: 1+

## 2025-03-18 ASSESSMENT — PACHYMETRY
OS_CT_UM: 626
OD_CT_CORRECTION: -5
OS_CT_CORRECTION: -6
OD_CT_UM: 614

## 2025-03-18 ASSESSMENT — CORNEAL PTERYGIUM: OD_PTERYGIUM: TEMPORAL

## 2025-03-18 ASSESSMENT — CONFRONTATIONAL VISUAL FIELD TEST (CVF)
OS_FINDINGS: FULL
OD_FINDINGS: FULL

## 2025-03-18 ASSESSMENT — VISUAL ACUITY
OS_BCVA: 20/20
OD_BCVA: 20/30

## 2025-07-01 ENCOUNTER — NON-APPOINTMENT (OUTPATIENT)
Age: 60
End: 2025-07-01

## 2025-07-01 ENCOUNTER — APPOINTMENT (OUTPATIENT)
Dept: INTERNAL MEDICINE | Facility: CLINIC | Age: 60
End: 2025-07-01
Payer: COMMERCIAL

## 2025-07-01 VITALS
BODY MASS INDEX: 21.62 KG/M2 | DIASTOLIC BLOOD PRESSURE: 78 MMHG | HEIGHT: 63 IN | HEART RATE: 78 BPM | WEIGHT: 122 LBS | SYSTOLIC BLOOD PRESSURE: 132 MMHG | RESPIRATION RATE: 15 BRPM

## 2025-07-01 PROBLEM — M25.511 RIGHT SHOULDER PAIN: Status: ACTIVE | Noted: 2025-07-01

## 2025-07-01 PROBLEM — Z01.818 PREOP EXAMINATION: Status: RESOLVED | Noted: 2023-08-14 | Resolved: 2025-07-01

## 2025-07-01 PROBLEM — Z01.818 PREOP EXAMINATION: Status: ACTIVE | Noted: 2025-07-01

## 2025-07-01 PROCEDURE — G2211 COMPLEX E/M VISIT ADD ON: CPT

## 2025-07-01 PROCEDURE — 93000 ELECTROCARDIOGRAM COMPLETE: CPT

## 2025-07-01 PROCEDURE — 99214 OFFICE O/P EST MOD 30 MIN: CPT

## 2025-07-02 LAB
ALBUMIN SERPL ELPH-MCNC: 4.4 G/DL
ALP BLD-CCNC: 91 U/L
ALT SERPL-CCNC: 29 U/L
ANION GAP SERPL CALC-SCNC: 12 MMOL/L
APPEARANCE: CLEAR
APTT BLD: 54.6 SEC
AST SERPL-CCNC: 23 U/L
BACTERIA: NEGATIVE /HPF
BASOPHILS # BLD AUTO: 0.06 K/UL
BASOPHILS NFR BLD AUTO: 0.7 %
BILIRUB SERPL-MCNC: 0.3 MG/DL
BILIRUBIN URINE: NEGATIVE
BLOOD URINE: NEGATIVE
BUN SERPL-MCNC: 7 MG/DL
CALCIUM SERPL-MCNC: 9.6 MG/DL
CAST: 0 /LPF
CHLORIDE SERPL-SCNC: 97 MMOL/L
CHOLEST SERPL-MCNC: 202 MG/DL
CO2 SERPL-SCNC: 24 MMOL/L
COLOR: NORMAL
CREAT SERPL-MCNC: 0.65 MG/DL
EGFRCR SERPLBLD CKD-EPI 2021: 102 ML/MIN/1.73M2
EOSINOPHIL # BLD AUTO: 0.05 K/UL
EOSINOPHIL NFR BLD AUTO: 0.6 %
EPITHELIAL CELLS: 4 /HPF
GLUCOSE QUALITATIVE U: NEGATIVE MG/DL
GLUCOSE SERPL-MCNC: 108 MG/DL
HCT VFR BLD CALC: 40.2 %
HDLC SERPL-MCNC: 46 MG/DL
HGB BLD-MCNC: 13.6 G/DL
IMM GRANULOCYTES NFR BLD AUTO: 0.2 %
INR PPP: 1.05 RATIO
KETONES URINE: ABNORMAL MG/DL
LDLC SERPL-MCNC: 130 MG/DL
LEUKOCYTE ESTERASE URINE: ABNORMAL
LYMPHOCYTES # BLD AUTO: 1.96 K/UL
LYMPHOCYTES NFR BLD AUTO: 21.6 %
MAN DIFF?: NORMAL
MCHC RBC-ENTMCNC: 33.4 PG
MCHC RBC-ENTMCNC: 33.8 G/DL
MCV RBC AUTO: 98.8 FL
MICROSCOPIC-UA: NORMAL
MONOCYTES # BLD AUTO: 0.55 K/UL
MONOCYTES NFR BLD AUTO: 6.1 %
NEUTROPHILS # BLD AUTO: 6.45 K/UL
NEUTROPHILS NFR BLD AUTO: 70.8 %
NITRITE URINE: NEGATIVE
NONHDLC SERPL-MCNC: 156 MG/DL
PH URINE: 7
PLATELET # BLD AUTO: 350 K/UL
POTASSIUM SERPL-SCNC: 4.6 MMOL/L
PROT SERPL-MCNC: 7.1 G/DL
PROTEIN URINE: NORMAL MG/DL
PT BLD: 12.4 SEC
RBC # BLD: 4.07 M/UL
RBC # FLD: 12.5 %
RED BLOOD CELLS URINE: 5 /HPF
SODIUM SERPL-SCNC: 133 MMOL/L
SPECIFIC GRAVITY URINE: 1.02
TRIGL SERPL-MCNC: 145 MG/DL
UROBILINOGEN URINE: 1 MG/DL
WBC # FLD AUTO: 9.09 K/UL
WHITE BLOOD CELLS URINE: 0 /HPF

## 2025-07-23 ENCOUNTER — APPOINTMENT (OUTPATIENT)
Dept: INTERNAL MEDICINE | Facility: CLINIC | Age: 60
End: 2025-07-23
Payer: COMMERCIAL

## 2025-07-23 PROCEDURE — 36415 COLL VENOUS BLD VENIPUNCTURE: CPT

## 2025-07-24 DIAGNOSIS — R79.1 ABNORMAL COAGULATION PROFILE: ICD-10-CM

## 2025-07-24 LAB
APTT BLD: 52.5 SEC
INR PPP: 1.03 RATIO
PT BLD: 12.2 SEC

## 2025-07-25 ENCOUNTER — APPOINTMENT (OUTPATIENT)
Dept: HEMATOLOGY ONCOLOGY | Facility: CLINIC | Age: 60
End: 2025-07-25
Payer: COMMERCIAL

## 2025-07-25 VITALS
TEMPERATURE: 98.2 F | DIASTOLIC BLOOD PRESSURE: 78 MMHG | HEART RATE: 87 BPM | OXYGEN SATURATION: 98 % | WEIGHT: 130 LBS | BODY MASS INDEX: 23.03 KG/M2 | SYSTOLIC BLOOD PRESSURE: 144 MMHG

## 2025-07-25 PROCEDURE — 99204 OFFICE O/P NEW MOD 45 MIN: CPT

## 2025-07-29 LAB
APTT 2H P 1:4 NP PPP: 53.2 SEC
APTT 2H P INC PPP: 54.4 SEC
APTT BLD: 46.5 SEC
APTT IMM NP/PRE NP PPP: 50.7 SEC
APTT INV RATIO PPP: 46.5 SEC
B2 GLYCOPROT1 IGG SERPL IA-ACNC: 4.2 U/ML
B2 GLYCOPROT1 IGM SERPL IA-ACNC: 5.6 U/ML
CARDIOLIPIN IGG SER IA-ACNC: 2.5 GPL U/ML
CARDIOLIPIN IGM SER IA-ACNC: 4.2 MPL U/ML
CONFIRM: 28.6 SEC
DRVVT IMM 1:2 NP PPP: ABNORMAL
DRVVT SCREEN TO CONFIRM RATIO: 1.62 RATIO
NPP NORMAL POOLED PLASMA: 30.8 SEC
SCREEN DRVVT: 60.4 SEC
SILICA CLOTTING TIME INTERPRETATION: ABNORMAL
SILICA CLOTTING TIME S/C: 2.74 RATIO

## 2025-08-05 LAB
FACT IX ACT/NOR PPP: NORMAL
FACT VIII ACT/NOR PPP: NORMAL
FACT XI ACT/NOR PPP: NORMAL